# Patient Record
Sex: MALE | Race: WHITE | NOT HISPANIC OR LATINO | Employment: FULL TIME | URBAN - NONMETROPOLITAN AREA
[De-identification: names, ages, dates, MRNs, and addresses within clinical notes are randomized per-mention and may not be internally consistent; named-entity substitution may affect disease eponyms.]

---

## 2017-01-17 ENCOUNTER — HOSPITAL ENCOUNTER (OUTPATIENT)
Dept: GENERAL RADIOLOGY | Facility: HOSPITAL | Age: 32
Discharge: HOME | End: 2017-01-17
Attending: SURGERY

## 2017-02-23 ENCOUNTER — TRANSCRIBE ORDERS (OUTPATIENT)
Dept: ADMINISTRATIVE | Facility: HOSPITAL | Age: 32
End: 2017-02-23

## 2019-02-07 ENCOUNTER — OFFICE VISIT (OUTPATIENT)
Dept: PSYCHIATRY | Facility: CLINIC | Age: 34
End: 2019-02-07

## 2019-02-07 DIAGNOSIS — F11.20 OPIOID DEPENDENCE ON MAINTENANCE AGONIST THERAPY, NO SYMPTOMS (HCC): Primary | ICD-10-CM

## 2019-02-07 PROCEDURE — 90791 PSYCH DIAGNOSTIC EVALUATION: CPT | Performed by: SOCIAL WORKER
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
## 2020-04-02 ENCOUNTER — HOSPITAL ENCOUNTER (EMERGENCY)
Facility: HOSPITAL | Age: 35
Discharge: STILL ADMITTED OR ADMINISTRATIVE | End: 2020-04-02
Attending: FAMILY MEDICINE

## 2020-04-02 ENCOUNTER — HOSPITAL ENCOUNTER (INPATIENT)
Facility: HOSPITAL | Age: 35
LOS: 5 days | Discharge: HOME | End: 2020-04-07
Attending: PSYCHIATRY & NEUROLOGY | Admitting: PSYCHIATRY & NEUROLOGY

## 2020-04-02 VITALS
BODY MASS INDEX: 26.6 KG/M2 | RESPIRATION RATE: 18 BRPM | DIASTOLIC BLOOD PRESSURE: 88 MMHG | TEMPERATURE: 98.6 F | HEIGHT: 71 IN | OXYGEN SATURATION: 100 % | HEART RATE: 89 BPM | SYSTOLIC BLOOD PRESSURE: 174 MMHG | WEIGHT: 190 LBS

## 2020-04-02 DIAGNOSIS — F11.10 OPIATE ABUSE, CONTINUOUS (HCC): Primary | ICD-10-CM

## 2020-04-02 DIAGNOSIS — F11.93 OPIATE WITHDRAWAL (HCC): ICD-10-CM

## 2020-04-02 PROBLEM — F19.10 POLYSUBSTANCE ABUSE (HCC): Status: ACTIVE | Noted: 2020-04-02

## 2020-04-02 LAB
6-ACETYL MORPHINE: NEGATIVE
ALBUMIN SERPL-MCNC: 4.2 G/DL (ref 3.5–5.2)
ALBUMIN/GLOB SERPL: 1.4 G/DL
ALP SERPL-CCNC: 59 U/L (ref 39–117)
ALT SERPL W P-5'-P-CCNC: 8 U/L (ref 1–41)
AMPHET+METHAMPHET UR QL: NEGATIVE
ANION GAP SERPL CALCULATED.3IONS-SCNC: 9.5 MMOL/L (ref 5–15)
AST SERPL-CCNC: 15 U/L (ref 1–40)
BACTERIA UR QL AUTO: ABNORMAL /HPF
BARBITURATES UR QL SCN: NEGATIVE
BASOPHILS # BLD AUTO: 0.08 10*3/MM3 (ref 0–0.2)
BASOPHILS NFR BLD AUTO: 0.6 % (ref 0–1.5)
BENZODIAZ UR QL SCN: POSITIVE
BILIRUB SERPL-MCNC: 0.4 MG/DL (ref 0.2–1.2)
BILIRUB UR QL STRIP: NEGATIVE
BUN BLD-MCNC: 8 MG/DL (ref 6–20)
BUN/CREAT SERPL: 8.9 (ref 7–25)
BUPRENORPHINE SERPL-MCNC: NEGATIVE NG/ML
CALCIUM SPEC-SCNC: 9.3 MG/DL (ref 8.6–10.5)
CANNABINOIDS SERPL QL: NEGATIVE
CHLORIDE SERPL-SCNC: 104 MMOL/L (ref 98–107)
CLARITY UR: ABNORMAL
CO2 SERPL-SCNC: 28.5 MMOL/L (ref 22–29)
COCAINE UR QL: NEGATIVE
COLOR UR: YELLOW
CREAT BLD-MCNC: 0.9 MG/DL (ref 0.76–1.27)
DEPRECATED RDW RBC AUTO: 41.4 FL (ref 37–54)
EOSINOPHIL # BLD AUTO: 0.29 10*3/MM3 (ref 0–0.4)
EOSINOPHIL NFR BLD AUTO: 2 % (ref 0.3–6.2)
ERYTHROCYTE [DISTWIDTH] IN BLOOD BY AUTOMATED COUNT: 12.6 % (ref 12.3–15.4)
ETHANOL BLD-MCNC: <10 MG/DL (ref 0–10)
ETHANOL UR QL: <0.01 %
GFR SERPL CREATININE-BSD FRML MDRD: 98 ML/MIN/1.73
GLOBULIN UR ELPH-MCNC: 2.9 GM/DL
GLUCOSE BLD-MCNC: 99 MG/DL (ref 65–99)
GLUCOSE UR STRIP-MCNC: NEGATIVE MG/DL
HCT VFR BLD AUTO: 45.9 % (ref 37.5–51)
HGB BLD-MCNC: 15.7 G/DL (ref 13–17.7)
HGB UR QL STRIP.AUTO: NEGATIVE
HYALINE CASTS UR QL AUTO: ABNORMAL /LPF
IMM GRANULOCYTES # BLD AUTO: 0.04 10*3/MM3 (ref 0–0.05)
IMM GRANULOCYTES NFR BLD AUTO: 0.3 % (ref 0–0.5)
KETONES UR QL STRIP: NEGATIVE
LEUKOCYTE ESTERASE UR QL STRIP.AUTO: ABNORMAL
LYMPHOCYTES # BLD AUTO: 2.83 10*3/MM3 (ref 0.7–3.1)
LYMPHOCYTES NFR BLD AUTO: 19.9 % (ref 19.6–45.3)
MAGNESIUM SERPL-MCNC: 2.1 MG/DL (ref 1.6–2.6)
MCH RBC QN AUTO: 30.3 PG (ref 26.6–33)
MCHC RBC AUTO-ENTMCNC: 34.2 G/DL (ref 31.5–35.7)
MCV RBC AUTO: 88.6 FL (ref 79–97)
METHADONE UR QL SCN: NEGATIVE
MONOCYTES # BLD AUTO: 0.76 10*3/MM3 (ref 0.1–0.9)
MONOCYTES NFR BLD AUTO: 5.3 % (ref 5–12)
NEUTROPHILS # BLD AUTO: 10.23 10*3/MM3 (ref 1.7–7)
NEUTROPHILS NFR BLD AUTO: 71.9 % (ref 42.7–76)
NITRITE UR QL STRIP: NEGATIVE
NRBC BLD AUTO-RTO: 0 /100 WBC (ref 0–0.2)
OPIATES UR QL: POSITIVE
OXYCODONE UR QL SCN: NEGATIVE
PCP UR QL SCN: NEGATIVE
PH UR STRIP.AUTO: 8.5 [PH] (ref 5–8)
PLATELET # BLD AUTO: 356 10*3/MM3 (ref 140–450)
PMV BLD AUTO: 9.4 FL (ref 6–12)
POTASSIUM BLD-SCNC: 4.5 MMOL/L (ref 3.5–5.2)
PROT SERPL-MCNC: 7.1 G/DL (ref 6–8.5)
PROT UR QL STRIP: NEGATIVE
RBC # BLD AUTO: 5.18 10*6/MM3 (ref 4.14–5.8)
RBC # UR: ABNORMAL /HPF
REF LAB TEST METHOD: ABNORMAL
SODIUM BLD-SCNC: 142 MMOL/L (ref 136–145)
SP GR UR STRIP: 1.02 (ref 1–1.03)
SQUAMOUS #/AREA URNS HPF: ABNORMAL /HPF
UROBILINOGEN UR QL STRIP: ABNORMAL
WBC NRBC COR # BLD: 14.23 10*3/MM3 (ref 3.4–10.8)
WBC UR QL AUTO: ABNORMAL /HPF

## 2020-04-02 PROCEDURE — 80053 COMPREHEN METABOLIC PANEL: CPT | Performed by: FAMILY MEDICINE

## 2020-04-02 PROCEDURE — 80307 DRUG TEST PRSMV CHEM ANLYZR: CPT | Performed by: FAMILY MEDICINE

## 2020-04-02 PROCEDURE — 81001 URINALYSIS AUTO W/SCOPE: CPT | Performed by: FAMILY MEDICINE

## 2020-04-02 PROCEDURE — 99284 EMERGENCY DEPT VISIT MOD MDM: CPT

## 2020-04-02 PROCEDURE — 83735 ASSAY OF MAGNESIUM: CPT | Performed by: FAMILY MEDICINE

## 2020-04-02 PROCEDURE — 85025 COMPLETE CBC W/AUTO DIFF WBC: CPT | Performed by: FAMILY MEDICINE

## 2020-04-02 PROCEDURE — 93005 ELECTROCARDIOGRAM TRACING: CPT | Performed by: PSYCHIATRY & NEUROLOGY

## 2020-04-02 PROCEDURE — HZ2ZZZZ DETOXIFICATION SERVICES FOR SUBSTANCE ABUSE TREATMENT: ICD-10-PCS | Performed by: PSYCHIATRY & NEUROLOGY

## 2020-04-02 PROCEDURE — 93010 ELECTROCARDIOGRAM REPORT: CPT | Performed by: SPECIALIST

## 2020-04-02 RX ORDER — IBUPROFEN 400 MG/1
400 TABLET ORAL EVERY 6 HOURS PRN
Status: DISCONTINUED | OUTPATIENT
Start: 2020-04-02 | End: 2020-04-07 | Stop reason: HOSPADM

## 2020-04-02 RX ORDER — ONDANSETRON 4 MG/1
4 TABLET, FILM COATED ORAL EVERY 6 HOURS PRN
Status: DISCONTINUED | OUTPATIENT
Start: 2020-04-02 | End: 2020-04-07 | Stop reason: HOSPADM

## 2020-04-02 RX ORDER — CLONIDINE HYDROCHLORIDE 0.1 MG/1
0.1 TABLET ORAL 2 TIMES DAILY PRN
Status: ACTIVE | OUTPATIENT
Start: 2020-04-05 | End: 2020-04-06

## 2020-04-02 RX ORDER — CLONIDINE HYDROCHLORIDE 0.1 MG/1
0.1 TABLET ORAL 3 TIMES DAILY PRN
Status: DISPENSED | OUTPATIENT
Start: 2020-04-04 | End: 2020-04-05

## 2020-04-02 RX ORDER — BENZTROPINE MESYLATE 1 MG/1
2 TABLET ORAL ONCE AS NEEDED
Status: DISCONTINUED | OUTPATIENT
Start: 2020-04-02 | End: 2020-04-07 | Stop reason: HOSPADM

## 2020-04-02 RX ORDER — FAMOTIDINE 20 MG/1
20 TABLET, FILM COATED ORAL 2 TIMES DAILY PRN
Status: DISCONTINUED | OUTPATIENT
Start: 2020-04-02 | End: 2020-04-07 | Stop reason: HOSPADM

## 2020-04-02 RX ORDER — LORAZEPAM 0.5 MG/1
0.5 TABLET ORAL EVERY 4 HOURS PRN
Status: ACTIVE | OUTPATIENT
Start: 2020-04-06 | End: 2020-04-07

## 2020-04-02 RX ORDER — CLONIDINE HYDROCHLORIDE 0.1 MG/1
0.1 TABLET ORAL 4 TIMES DAILY PRN
Status: ACTIVE | OUTPATIENT
Start: 2020-04-02 | End: 2020-04-03

## 2020-04-02 RX ORDER — LORAZEPAM 2 MG/1
2 TABLET ORAL EVERY 4 HOURS PRN
Status: ACTIVE | OUTPATIENT
Start: 2020-04-03 | End: 2020-04-04

## 2020-04-02 RX ORDER — LORAZEPAM 1 MG/1
1 TABLET ORAL
Status: COMPLETED | OUTPATIENT
Start: 2020-04-05 | End: 2020-04-05

## 2020-04-02 RX ORDER — HYDROXYZINE 50 MG/1
50 TABLET, FILM COATED ORAL EVERY 6 HOURS PRN
Status: DISCONTINUED | OUTPATIENT
Start: 2020-04-02 | End: 2020-04-07 | Stop reason: HOSPADM

## 2020-04-02 RX ORDER — LORAZEPAM 0.5 MG/1
0.5 TABLET ORAL
Status: DISPENSED | OUTPATIENT
Start: 2020-04-06 | End: 2020-04-07

## 2020-04-02 RX ORDER — BENZONATATE 100 MG/1
100 CAPSULE ORAL 3 TIMES DAILY PRN
Status: DISCONTINUED | OUTPATIENT
Start: 2020-04-02 | End: 2020-04-07 | Stop reason: HOSPADM

## 2020-04-02 RX ORDER — BENZTROPINE MESYLATE 1 MG/ML
1 INJECTION INTRAMUSCULAR; INTRAVENOUS ONCE AS NEEDED
Status: DISCONTINUED | OUTPATIENT
Start: 2020-04-02 | End: 2020-04-07 | Stop reason: HOSPADM

## 2020-04-02 RX ORDER — DICYCLOMINE HYDROCHLORIDE 10 MG/1
10 CAPSULE ORAL 3 TIMES DAILY PRN
Status: DISCONTINUED | OUTPATIENT
Start: 2020-04-02 | End: 2020-04-07 | Stop reason: HOSPADM

## 2020-04-02 RX ORDER — ECHINACEA PURPUREA EXTRACT 125 MG
2 TABLET ORAL AS NEEDED
Status: DISCONTINUED | OUTPATIENT
Start: 2020-04-02 | End: 2020-04-07 | Stop reason: HOSPADM

## 2020-04-02 RX ORDER — LORAZEPAM 2 MG/1
2 TABLET ORAL
Status: DISPENSED | OUTPATIENT
Start: 2020-04-02 | End: 2020-04-03

## 2020-04-02 RX ORDER — LOPERAMIDE HYDROCHLORIDE 2 MG/1
2 CAPSULE ORAL
Status: DISCONTINUED | OUTPATIENT
Start: 2020-04-02 | End: 2020-04-07 | Stop reason: HOSPADM

## 2020-04-02 RX ORDER — TRAZODONE HYDROCHLORIDE 50 MG/1
50 TABLET ORAL NIGHTLY PRN
Status: DISCONTINUED | OUTPATIENT
Start: 2020-04-02 | End: 2020-04-07 | Stop reason: HOSPADM

## 2020-04-02 RX ORDER — ACETAMINOPHEN 325 MG/1
650 TABLET ORAL EVERY 6 HOURS PRN
Status: DISCONTINUED | OUTPATIENT
Start: 2020-04-02 | End: 2020-04-07 | Stop reason: HOSPADM

## 2020-04-02 RX ORDER — CYCLOBENZAPRINE HCL 10 MG
10 TABLET ORAL 3 TIMES DAILY PRN
Status: DISCONTINUED | OUTPATIENT
Start: 2020-04-02 | End: 2020-04-07 | Stop reason: HOSPADM

## 2020-04-02 RX ORDER — CLONIDINE HYDROCHLORIDE 0.1 MG/1
0.1 TABLET ORAL 4 TIMES DAILY PRN
Status: DISPENSED | OUTPATIENT
Start: 2020-04-03 | End: 2020-04-04

## 2020-04-02 RX ORDER — LORAZEPAM 1 MG/1
1 TABLET ORAL EVERY 4 HOURS PRN
Status: ACTIVE | OUTPATIENT
Start: 2020-04-05 | End: 2020-04-06

## 2020-04-02 RX ORDER — CLONIDINE HYDROCHLORIDE 0.1 MG/1
0.1 TABLET ORAL DAILY PRN
Status: ACTIVE | OUTPATIENT
Start: 2020-04-06 | End: 2020-04-07

## 2020-04-02 RX ORDER — ALUMINA, MAGNESIA, AND SIMETHICONE 2400; 2400; 240 MG/30ML; MG/30ML; MG/30ML
15 SUSPENSION ORAL EVERY 6 HOURS PRN
Status: DISCONTINUED | OUTPATIENT
Start: 2020-04-02 | End: 2020-04-07 | Stop reason: HOSPADM

## 2020-04-02 RX ORDER — NICOTINE 21 MG/24HR
1 PATCH, TRANSDERMAL 24 HOURS TRANSDERMAL
Status: DISCONTINUED | OUTPATIENT
Start: 2020-04-02 | End: 2020-04-07

## 2020-04-02 RX ORDER — LORAZEPAM 2 MG/1
2 TABLET ORAL
Status: COMPLETED | OUTPATIENT
Start: 2020-04-03 | End: 2020-04-03

## 2020-04-02 RX ADMIN — LOPERAMIDE HYDROCHLORIDE 2 MG: 2 CAPSULE ORAL at 17:55

## 2020-04-02 RX ADMIN — LORAZEPAM 2 MG: 2 TABLET ORAL at 17:55

## 2020-04-02 NOTE — ED PROVIDER NOTES
Subjective   Patient is a 34-year-old male presents emergency department to detox from opiates.  The patient states that he plans on attending a rehab program who informed him that he needed to present to the ER to detox from opiates before he can enter into this program.  The patient states that this is not state-mandated.  He states that he has been using Percocet 10 mg 3 times a day and that he prefers to snort or inject them.  The patient states that he has not used any additional drugs.  He states that the last time he used was last night.  He denies any fever, chills or any additional symptoms.  He states right now his only symptom is that he feels very tired.  He has no other complaints at this time.          Review of Systems   Constitutional: Positive for fatigue. Negative for activity change, appetite change, chills, diaphoresis and fever.   HENT: Negative for congestion, sinus pressure, sinus pain, sneezing and sore throat.    Respiratory: Negative for apnea, cough, shortness of breath and wheezing.    Gastrointestinal: Negative for abdominal distention, abdominal pain, diarrhea, nausea and vomiting.   Genitourinary: Negative for difficulty urinating and flank pain.   Musculoskeletal: Negative for arthralgias and myalgias.   Neurological: Negative for dizziness, facial asymmetry, light-headedness and headaches.   Psychiatric/Behavioral: Negative for agitation and confusion.       Past Medical History:   Diagnosis Date   • Arrhythmia    • Chest pain    • Fatigue    • Gastroenteritis    • Hypertension    • Substance abuse (CMS/[facility])        No Known Allergies    Past Surgical History:   Procedure Laterality Date   • URETEROSCOPY LASER LITHOTRIPSY WITH STENT INSERTION         History reviewed. No pertinent family history.    Social History     Socioeconomic History   • Marital status:      Spouse name: Not on file   • Number of children: Not on file   • Years of education: Not on file   • Highest  education level: Not on file   Tobacco Use   • Smoking status: Current Every Day Smoker     Packs/day: 1.00     Types: Cigarettes   • Smokeless tobacco: Never Used   Substance and Sexual Activity   • Alcohol use: Not Currently   • Drug use: Yes     Types: Benzodiazepines, Heroin, Oxycodone   • Sexual activity: Not Currently     Partners: Female           Objective   Physical Exam   Constitutional: He is oriented to person, place, and time. He appears well-developed and well-nourished. No distress.   HENT:   Head: Normocephalic and atraumatic.   Right Ear: External ear normal.   Left Ear: External ear normal.   Nose: Nose normal.   Mouth/Throat: Oropharynx is clear and moist. No oropharyngeal exudate.   Eyes: Pupils are equal, round, and reactive to light. Conjunctivae and EOM are normal. Right eye exhibits no discharge. Left eye exhibits no discharge. No scleral icterus.   Neck: Normal range of motion. Neck supple. No JVD present. No tracheal deviation present. No thyromegaly present.   Cardiovascular: Normal rate, regular rhythm, normal heart sounds and intact distal pulses. Exam reveals no gallop and no friction rub.   No murmur heard.  Pulmonary/Chest: Effort normal and breath sounds normal. No stridor. No respiratory distress. He has no wheezes. He has no rales.   Abdominal: Soft. Bowel sounds are normal. He exhibits no distension and no mass. There is no tenderness.   Musculoskeletal: Normal range of motion. He exhibits no edema, tenderness or deformity.   Lymphadenopathy:     He has no cervical adenopathy.   Neurological: He is alert and oriented to person, place, and time. No cranial nerve deficit. Coordination normal.   Skin: Skin is warm and dry. Capillary refill takes less than 2 seconds. No rash noted. He is not diaphoretic. No erythema.   Psychiatric: He has a normal mood and affect. His behavior is normal.   Nursing note and vitals reviewed.      Procedures           ED Course                                            MDM  Number of Diagnoses or Management Options  Opiate abuse, continuous (CMS/HCC): new and requires workup  Opiate withdrawal (CMS/HCC): new and requires workup     Amount and/or Complexity of Data Reviewed  Clinical lab tests: ordered and reviewed    Risk of Complications, Morbidity, and/or Mortality  Presenting problems: moderate  Diagnostic procedures: moderate  Management options: moderate    Patient Progress  Patient progress: stable      Final diagnoses:   Opiate abuse, continuous (CMS/HCC)   Opiate withdrawal (CMS/HCC)            Danna Samaniego DO  04/02/20 8612

## 2020-04-02 NOTE — PLAN OF CARE
Problem: Patient Care Overview  Goal: Plan of Care Review  Outcome: Ongoing (interventions implemented as appropriate)  Flowsheets (Taken 4/2/2020 4861)  Progress: no change  Plan of Care Reviewed With: patient  Patient Agreement with Plan of Care: agrees

## 2020-04-02 NOTE — NURSING NOTE
Pt states he is here for detox from Xanax, states he takes 4 xanax several times throughout the week, maybe 3 days a week, with the last dose being 3 days ago, pt states he also takes percocet and Heroin, states his last percocet was 4 days ago, states he takes 30 mg at one time, several times weekly, states he also used Heroin for first time two days ago, using 1/10 of a gram.  Pt states his current craving level is 6/10 at this time.    Pt denies any SI/HI/AVH.    Pt states he has been clean for 6 years and just relapsed two months ago when he and his wife , and he does not want to get addicted like he once was, so he is here to seek detox and move forward.    COWS is a 12, CIWA is a 10, depression 6/10 and anxiety 0/10.

## 2020-04-02 NOTE — NURSING NOTE
Pt educated on importance of handwashing, social distancing and not touching his face. Pt verbalized understanding.

## 2020-04-02 NOTE — NURSING NOTE
Spoke with Dr. Nuno, discussed assessment and labs, new orders to admit the patient to detox with routine orders, SP4, Ativan and Clonidine detox. [id number]

## 2020-04-02 NOTE — NURSING NOTE
Patient is seeking help to detox from Xanax and opiates and go to rehab. He reports relapse after being clean for 6 years. He states that his 13 year marriage is ending. He reports finding out about 3 months ago his wife was using drugs again. She went missing for a period of time and she is not interested in getting clean. Patient states he has three children who are now living with his parents. He reports using 3-5 mg of Xanax, po or snort daily x 2-3 months. Taking Percocent 30 mg, snorted daily x 2-3 months and he reports using IV Heroin twice in the last several days. He reports no other hx of IV drug use. Anxiety rated 3/10, depression rated 7/10 and craving rated 5/10. Appetite is good, he reports fatigue and feeling like he wants to sleep all the time.

## 2020-04-03 PROBLEM — F13.20 SEVERE BENZODIAZEPINE USE DISORDER (HCC): Status: ACTIVE | Noted: 2020-04-03

## 2020-04-03 PROBLEM — F11.20 OPIOID USE DISORDER, SEVERE, DEPENDENCE (HCC): Status: ACTIVE | Noted: 2020-04-02

## 2020-04-03 LAB
HAV IGM SERPL QL IA: NORMAL
HBV CORE IGM SERPL QL IA: NORMAL
HBV SURFACE AG SERPL QL IA: NORMAL
HCV AB SER DONR QL: NORMAL
HIV1+2 AB SER QL: NORMAL
HOLD SPECIMEN: NORMAL

## 2020-04-03 PROCEDURE — 25010000002 INFLUENZA VAC SUBUNIT QUAD 0.5 ML SUSPENSION PREFILLED SYRINGE: Performed by: PSYCHIATRY & NEUROLOGY

## 2020-04-03 PROCEDURE — G0008 ADMIN INFLUENZA VIRUS VAC: HCPCS | Performed by: PSYCHIATRY & NEUROLOGY

## 2020-04-03 PROCEDURE — 80074 ACUTE HEPATITIS PANEL: CPT | Performed by: PSYCHIATRY & NEUROLOGY

## 2020-04-03 PROCEDURE — G0432 EIA HIV-1/HIV-2 SCREEN: HCPCS | Performed by: PSYCHIATRY & NEUROLOGY

## 2020-04-03 PROCEDURE — 90674 CCIIV4 VAC NO PRSV 0.5 ML IM: CPT | Performed by: PSYCHIATRY & NEUROLOGY

## 2020-04-03 PROCEDURE — 99223 1ST HOSP IP/OBS HIGH 75: CPT | Performed by: PSYCHIATRY & NEUROLOGY

## 2020-04-03 RX ADMIN — CYCLOBENZAPRINE HYDROCHLORIDE 10 MG: 10 TABLET, FILM COATED ORAL at 21:32

## 2020-04-03 RX ADMIN — CYCLOBENZAPRINE HYDROCHLORIDE 10 MG: 10 TABLET, FILM COATED ORAL at 08:27

## 2020-04-03 RX ADMIN — TRAZODONE HYDROCHLORIDE 50 MG: 50 TABLET ORAL at 21:32

## 2020-04-03 RX ADMIN — IBUPROFEN 400 MG: 400 TABLET, FILM COATED ORAL at 08:27

## 2020-04-03 RX ADMIN — LORAZEPAM 2 MG: 2 TABLET ORAL at 16:18

## 2020-04-03 RX ADMIN — INFLUENZA A VIRUS A/IDAHO/07/2018 (H1N1) ANTIGEN (MDCK CELL DERIVED, PROPIOLACTONE INACTIVATED, INFLUENZA A VIRUS A/INDIANA/08/2018 (H3N2) ANTIGEN (MDCK CELL DERIVED, PROPIOLACTONE INACTIVATED), INFLUENZA B VIRUS B/SINGAPORE/INFTT-16-0610/2016 ANTIGEN (MDCK CELL DERIVED, PROPIOLACTONE INACTIVATED), INFLUENZA B VIRUS B/IOWA/06/2017 ANTIGEN (MDCK CELL DERIVED, PROPIOLACTONE INACTIVATED) 0.5 ML: 15; 15; 15; 15 INJECTION, SUSPENSION INTRAMUSCULAR at 08:28

## 2020-04-03 RX ADMIN — LOPERAMIDE HYDROCHLORIDE 2 MG: 2 CAPSULE ORAL at 08:27

## 2020-04-03 RX ADMIN — LORAZEPAM 2 MG: 2 TABLET ORAL at 21:33

## 2020-04-03 RX ADMIN — HYDROXYZINE HYDROCHLORIDE 50 MG: 50 TABLET ORAL at 20:46

## 2020-04-03 RX ADMIN — CLONIDINE HYDROCHLORIDE 0.1 MG: 0.1 TABLET ORAL at 20:46

## 2020-04-03 RX ADMIN — LORAZEPAM 2 MG: 2 TABLET ORAL at 08:27

## 2020-04-03 NOTE — PROGRESS NOTES
Review of Systems   Constitutional: Positive for fatigue.   HENT: Negative.    Eyes: Negative.    Respiratory: Negative.    Cardiovascular: Negative.    Gastrointestinal: Positive for diarrhea.   Endocrine: Negative.    Genitourinary: Negative.    Musculoskeletal: Positive for myalgias.   Skin: Negative.    Allergic/Immunologic: Negative.    Neurological: Negative.    Hematological: Negative.    Psychiatric/Behavioral: Positive for dysphoric mood. The patient is nervous/anxious.        Physical Exam   Constitutional: oriented to person, place, and time. Appears well-developed and well-nourished.   HENT:   Head: Normocephalic and atraumatic.   Right Ear: External ear normal.   Left Ear: External ear normal.   Mouth/Throat: Oropharynx is clear and moist.   Eyes: Pupils are equal, round, and reactive to light. Conjunctivae and EOM are normal.   Neck: Normal range of motion. Neck supple.   Cardiovascular: Normal rate, regular rhythm and normal heart sounds.    Pulmonary/Chest: Effort normal and breath sounds normal. No respiratory distress. No wheezes.   Abdominal: Soft. Bowel sounds are normal.No distension. There is no tenderness.   Musculoskeletal: Normal range of motion. No edema or deformity.   Neurological:Alert and oriented to person, place, and time. No cranial nerve deficit. Coordination normal.   Skin: Track marks noted right AC area.    DATA  Labs reviewed  EKG reviewed, QTc 438.  ESTHELA reviewed  Record reviewed. The patient was seen in the outpatient behavioral health clinic in Feb 2019 for an evaluation for his work. At that time he was on MAT.    DX    Opioid use disorder, severe, dependence (CMS/HCC)  - Clonidine detox      Severe benzodiazepine use disorder (CMS/HCC)  - Ativan detox

## 2020-04-03 NOTE — PROGRESS NOTES
1200:     DATA:      This provider is located at [facility], and the Patient  is seen remotely at [facility] using [facility]. The patient's condition being treated is appropriate for telehealth services. The provider identified herself as well as her credentials.   The patient has given consent to be seen remotely, and when consent is given they understand that the consent allows for patient identifiable information to be sent to a third party as needed.   They may refuse to be seen remotely at any time. The electronic data is encrypted and password protected, and the patient has been advised of the potential risks to privacy not withstanding such measures.     Therapist met individually with patient this date to introduce role and to discuss hospitalization expectations. Patient agreeable.     Patient signed consent for his mother Sanjuanita at [phone]; Sanjuanita was contacted this date and supportive. She reports that she hopes that patient will stick with plan and enter [facility]. She was agreeable to [facility] transporting at discharge.      Clinical Maneuvering/Intervention:     Therapist assisted patient in processing above session content; acknowledged and normalized patient’s thoughts, feelings, and concerns.  Discussed the therapist/patient relationship and explain the parameters and limitations of relative confidentiality.  Also discussed the importance of active participation, and honesty to the treatment process.  Encouraged the patient to discuss/vent their feelings, frustrations, and fears concerning their ongoing medical issues and validated their feelings.     Discussed the importance of finding enjoyable activities and coping skills that the patient can engage in a regular basis. Discussed healthy coping skills such as distraction, self love, grounding, thought challenges/reframing, etc.  Provided patient with list of healthy coping skills this date.  Discussed the importance of medication compliance.  Praised the patient for seeking help and spent the majority of the session building rapport.       Allowed patient to freely discuss issues without interruption or judgment. Provided safe, confidential environment to facilitate the development of positive therapeutic relationship and encourage open, honest communication.    Therapist educated patient about the importance of social distancing and regular hand washing during COVID19. Provided emotional support and reassurance.       Therapist addressed discharge safety planning this date. Assisted patient in identifying risk factors which would indicate the need for higher level of care after discharge;  including thoughts to harm self or others and/or self-harming behavior. Encouraged patient to call 911, or present to the nearest emergency room should any of these events occur. Discussed crisis intervention services and means to access.  Encouraged securing any objects of harm.      Therapist educated patient about the importance of social distancing and regular hand washing during COVID19.      Therapist completed integrated summary, treatment plan, and initiated social history this date.  Therapist is strongly encouraging family involvement in treatment.       ASSESSMENT:      The patient is a 34 year old , , unemployed male.  Patient calm and cooperative and seen in the office with telehealth services.  Per report, Patient is seeking help to detox from Xanax and Opiates and go to rehab. He reports relapse after being clean for 6 years. He states that his 13 year marriage is ending, he had thought his wife had nickolas acting bizarrely, and found out she had started using drugs. Patient states he has three children who are now living with his parents. He reports using 3-5 mg of Xanax, po or snort daily x 2-3 months. Taking Percocent 30 mg, snorted daily x 2-3 months and he reports using IV Heroin  twice in the last several days. Patent reports that he is motivated to get sober for his kids and to get his job back at the [employer].  Patient reports that he had went to [facility] and that they recommended patient seek detox first.  He has signed consent to return to [facility] at discharge.      PLAN:       Patient to remain hospitalized this date.     Treatment team will focus efforts on stabilizing patient's acute symptoms while providing education on healthy coping and crisis management to reduce hospitalizations.   Patient requires daily psychiatrist evaluation and 24/7 nursing supervision to promote patient  safety.     Therapist will offer 1-4 individual sessions, 1 therapy group daily, family education, and appropriate referral.    Therapist recommends residential referral at discharge. Patient has signed consent for [facility]. [facility] contacted this date and confirmed bed at discharge. [facility] to transport.

## 2020-04-03 NOTE — PLAN OF CARE
Problem: Patient Care Overview  Goal: Plan of Care Review  Outcome: Ongoing (interventions implemented as appropriate)  Flowsheets (Taken 4/3/2020 8010)  Progress: improving  Plan of Care Reviewed With: patient  Patient Agreement with Plan of Care: agrees       Pt reports leg cramps, stomach cramps, sweating, anxiety 5/10, depression 7/10, and cravings 7/10.  Elevated blood pressure and tremors +1. Pt participated in group, appetite is good, and slept well.

## 2020-04-03 NOTE — PLAN OF CARE
Problem: Patient Care Overview  Goal: Plan of Care Review  Outcome: Ongoing (interventions implemented as appropriate)  Flowsheets  Taken 4/3/2020 1242 by Antonieta Molina  Consent Given to Review Plan with: MotherSanjuanita  Progress: no change  Plan of Care Reviewed With: patient;mother  Outcome Summary: Therapist met individually with patient this date. Completed social history and integrated summary  Taken 4/3/2020 0710 by Ashley Enciso RN  Patient Agreement with Plan of Care: agrees  Goal: Individualization and Mutuality  Outcome: Ongoing (interventions implemented as appropriate)  Flowsheets  Taken 4/3/2020 1159  Patient Personal Strengths: expressive of needs;expressive of emotions;family/social support;motivated for recovery;motivated for treatment;resourceful;stable living environment;spiritual/Jainism support  Patient Vulnerabilities: Ineffective coping   Taken 4/3/2020 1242  Patient Specific Goals (Include Timeframe): Patient will deny acute withdrawal symptoms prior to discharge. Patient will identify 1 healthy coping skill as alternative to substance use prior to discharge.  Patient will consent to appropriat aftercare plan prior to discharge.  How to Address Anxieties/Fears: Patient agreeable to talk to staff as needed  Patient Specific Interventions: Therapist will offer 1-4 individual sessions, family education, aftercare planning  What Information Would Help Us Give You More Personalized Care?: None reported  How Would You and/or Your Support Person Like to Participate in Your Care?: Patient signed consent to involve his mother Sanjuanita via telephone  What Anxieties, Fears, Concerns, or Questions Do You Have About Your Care?: None reported.  Patient Specific Preferences: None reported  Goal: Discharge Needs Assessment  Outcome: Ongoing (interventions implemented as appropriate)  Flowsheets  Taken 4/3/2020 1242 by Antonieta Molina  Outpatient/Agency/Support Group Needs:  residential services  Discharge Coordination/Progress: Patient has insurance for discharge planning.  Patient signed consent for [facility].  Concerns Comments: Patient has insurance for dischrage planning.  Anticipated Discharge Disposition: inpatient rehab facility  Transportation Concerns: car, none  Current Discharge Risk: substance use/abuse;financial support inadequate  Concerns to be Addressed: coping/stress;mental health;substance/tobacco abuse/use;relationship  Readmission Within the Last 30 Days: no previous admission in last 30 days  Patient's Choice of Community Agency(s): [facility]  Offered/Gave Vendor List: no  Taken 4/2/2020 1808 by Michaelle Armendariz, RN  Transportation Anticipated: family or friend will provide  Patient/Family Anticipated Services at Transition: rehabilitation services  Patient/Family Anticipates Transition to: inpatient rehabilitation facility  Goal: Interprofessional Rounds/Family Conf  Outcome: Ongoing (interventions implemented as appropriate)  Flowsheets (Taken 4/3/2020 1242)  Participants: patient; social work; family; psychiatrist; nursing; milieu/psych techs     Problem: Overarching Goals (Adult)  Goal: Adheres to Safety Considerations for Self and Others  Outcome: Ongoing (interventions implemented as appropriate)  Goal: Optimized Coping Skills in Response to Life Stressors  Intervention: Promote Effective Coping Strategies  Flowsheets (Taken 4/3/2020 0710 by Ashley Enciso, RN)  Supportive Measures: active listening utilized;self-care encouraged;verbalization of feelings encouraged  Goal: Develops/Participates in Therapeutic Declo to Support Successful Transition  Intervention: Foster Therapeutic Declo  Flowsheets (Taken 4/3/2020 0710 by Ashley Enciso, RN)  Trust Relationship/Rapport: care explained;choices provided;emotional support provided;empathic listening provided;reassurance provided;thoughts/feelings acknowledged;questions encouraged;questions  answered  Intervention: Mutually Develop Transition Plan  Flowsheets (Taken 4/3/2020 4448)  Transition Support: community resources reviewed; follow-up care coordinated; follow-up care discussed; crisis management plan promoted; crisis management plan verbalized

## 2020-04-03 NOTE — PLAN OF CARE
Problem: Patient Care Overview  Goal: Plan of Care Review  Outcome: Ongoing (interventions implemented as appropriate)  Flowsheets (Taken 4/3/2020 1432)  Progress: no change  Plan of Care Reviewed With: patient  Patient Agreement with Plan of Care: agrees   Patient has isolated in his room most of the day, C/o fatigued, body aches, and diarrhea.
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
## 2020-04-04 PROCEDURE — 99232 SBSQ HOSP IP/OBS MODERATE 35: CPT | Performed by: PSYCHIATRY & NEUROLOGY

## 2020-04-04 RX ADMIN — LORAZEPAM 1.5 MG: 1 TABLET ORAL at 15:03

## 2020-04-04 RX ADMIN — CYCLOBENZAPRINE HYDROCHLORIDE 10 MG: 10 TABLET, FILM COATED ORAL at 21:01

## 2020-04-04 RX ADMIN — IBUPROFEN 400 MG: 400 TABLET, FILM COATED ORAL at 08:22

## 2020-04-04 RX ADMIN — ACETAMINOPHEN 650 MG: 325 TABLET ORAL at 12:40

## 2020-04-04 RX ADMIN — LOPERAMIDE HYDROCHLORIDE 2 MG: 2 CAPSULE ORAL at 12:40

## 2020-04-04 RX ADMIN — CLONIDINE HYDROCHLORIDE 0.1 MG: 0.1 TABLET ORAL at 20:06

## 2020-04-04 RX ADMIN — TRAZODONE HYDROCHLORIDE 50 MG: 50 TABLET ORAL at 21:01

## 2020-04-04 RX ADMIN — HYDROXYZINE HYDROCHLORIDE 50 MG: 50 TABLET ORAL at 21:01

## 2020-04-04 RX ADMIN — LOPERAMIDE HYDROCHLORIDE 2 MG: 2 CAPSULE ORAL at 20:36

## 2020-04-04 RX ADMIN — CYCLOBENZAPRINE HYDROCHLORIDE 10 MG: 10 TABLET, FILM COATED ORAL at 12:40

## 2020-04-04 RX ADMIN — LORAZEPAM 1.5 MG: 1 TABLET ORAL at 08:22

## 2020-04-04 RX ADMIN — LORAZEPAM 1.5 MG: 1 TABLET ORAL at 21:01

## 2020-04-04 NOTE — PLAN OF CARE
Problem: Patient Care Overview  Goal: Plan of Care Review  Outcome: Ongoing (interventions implemented as appropriate)  Flowsheets  Taken 4/3/2020 1242 by Antonieta Molina  Consent Given to Review Plan with: Sanjuanita Segura  Outcome Summary: Therapist met individually with patient this date. Completed social history and integrated summary  Taken 4/4/2020 0615 by Jazmín Feliz, RN  Progress: improving  Taken 4/4/2020 0742 by Darrian Velasco, RN  Plan of Care Reviewed With: patient  Patient Agreement with Plan of Care: agrees

## 2020-04-04 NOTE — PROGRESS NOTES
"Chevy Ugalde is a 34 y.o. male who presents today for hospital follow up    Chief Complaint: Polysubstance abuse    History of Present Illness: Patient is a 15-year-old  male currently admitted with complaints of polysubstance abuse requesting detox from benzodiazepines and opioids.  He is evaluated at bedside today.  He denies any major withdrawal symptoms.  He states, \"I am doing good.\"  He denies any issues with sleep or appetite.  He denies any medication side effects.  He denies HI/SI/AVH.    The following portions of the patient's history were reviewed and updated as appropriate: allergies, current medications, past family history, past medical history, past social history, past surgical history and problem list.      Past Medical History:  Past Medical History:   Diagnosis Date   • Arrhythmia    • Chest pain    • Fatigue    • Gastroenteritis    • Hypertension    • Substance abuse (CMS/HCC)        Social History:  Social History     Socioeconomic History   • Marital status:      Spouse name: Not on file   • Number of children: Not on file   • Years of education: Not on file   • Highest education level: Not on file   Tobacco Use   • Smoking status: Current Every Day Smoker     Packs/day: 1.00     Years: 17.00     Pack years: 17.00     Types: Cigarettes   • Smokeless tobacco: Never Used   • Tobacco comment: smoking since 17 years old   Substance and Sexual Activity   • Alcohol use: Not Currently   • Drug use: Yes     Types: Benzodiazepines, Heroin, Oxycodone   • Sexual activity: Not Currently     Partners: Female       Family History:  No family history on file.    Past Surgical History:  Past Surgical History:   Procedure Laterality Date   • URETEROSCOPY LASER LITHOTRIPSY WITH STENT INSERTION         Problem List:  Patient Active Problem List   Diagnosis   • Opioid use disorder, severe, dependence (CMS/HCC)   • Severe benzodiazepine use disorder (CMS/HCC)       Allergy:   No Known " Allergies     Current Medications:   Current Facility-Administered Medications   Medication Dose Route Frequency Provider Last Rate Last Dose   • acetaminophen (TYLENOL) tablet 650 mg  650 mg Oral Q6H PRN Nick Nuno MD       • aluminum-magnesium hydroxide-simethicone (MAALOX MAX) 400-400-40 MG/5ML suspension 15 mL  15 mL Oral Q6H PRN Nick Nuno MD       • benzonatate (TESSALON) capsule 100 mg  100 mg Oral TID PRN Nick Nuno MD       • benztropine (COGENTIN) tablet 2 mg  2 mg Oral Once PRN Nikc Nuno MD        Or   • benztropine (COGENTIN) injection 1 mg  1 mg Intramuscular Once PRN Nick Nuno MD       • cloNIDine (CATAPRES) tablet 0.1 mg  0.1 mg Oral TID PRN Nick Nuno MD        Followed by   • [START ON 4/5/2020] cloNIDine (CATAPRES) tablet 0.1 mg  0.1 mg Oral BID PRN Nick Nuno MD        Followed by   • [START ON 4/6/2020] cloNIDine (CATAPRES) tablet 0.1 mg  0.1 mg Oral Daily PRN Nick Nuno MD       • cyclobenzaprine (FLEXERIL) tablet 10 mg  10 mg Oral TID PRN Nick Nuno MD   10 mg at 04/03/20 2132   • dicyclomine (BENTYL) capsule 10 mg  10 mg Oral TID PRN Nick Nuno MD       • famotidine (PEPCID) tablet 20 mg  20 mg Oral BID PRN Nick Nuno MD       • hydrOXYzine (ATARAX) tablet 50 mg  50 mg Oral Q6H PRN Nick Nuno MD   50 mg at 04/03/20 2046   • ibuprofen (ADVIL,MOTRIN) tablet 400 mg  400 mg Oral Q6H PRN Nick Nuno MD   400 mg at 04/04/20 0822   • loperamide (IMODIUM) capsule 2 mg  2 mg Oral Q2H PRN Nick Nuno MD   2 mg at 04/03/20 0827   • LORazepam (ATIVAN) tablet 1.5 mg  1.5 mg Oral 3 times per day Nick Nuno MD   1.5 mg at 04/04/20 0822    Followed by   • [START ON 4/5/2020] LORazepam (ATIVAN) tablet 1 mg  1 mg Oral 3 times per day Nick Nuno MD        Followed by   • [START ON 4/6/2020] LORazepam (ATIVAN) tablet 0.5 mg  0.5 mg Oral 3 times per day Nick Nuno MD       • LORazepam (ATIVAN) tablet 1.5 mg  1.5  "mg Oral Q4H PRN Nick Nuno MD        Followed by   • [START ON 4/5/2020] LORazepam (ATIVAN) tablet 1 mg  1 mg Oral Q4H PRN Nick Nuno MD        Followed by   • [START ON 4/6/2020] LORazepam (ATIVAN) tablet 0.5 mg  0.5 mg Oral Q4H PRN Nick Nuno MD       • magnesium hydroxide (MILK OF MAGNESIA) suspension 2400 mg/10mL 10 mL  10 mL Oral Daily PRN Nick Nuno MD       • nicotine (NICODERM CQ) 21 MG/24HR patch 1 patch  1 patch Transdermal Q24H Nick Nuno MD       • ondansetron (ZOFRAN) tablet 4 mg  4 mg Oral Q6H PRN Nick Nuno MD       • sodium chloride nasal spray 2 spray  2 spray Each Nare PRN Nick Nuno MD       • traZODone (DESYREL) tablet 50 mg  50 mg Oral Nightly PRN Nick Nuno MD   50 mg at 04/03/20 2132       Review of Symptoms:    Review of Systems   Constitutional: Negative.    HENT: Negative.    Eyes: Negative.    Respiratory: Negative.    Cardiovascular: Negative.    Gastrointestinal: Negative.    Endocrine: Negative.    Genitourinary: Negative.    Musculoskeletal: Negative.    Skin: Negative.    Allergic/Immunologic: Negative.    Neurological: Negative.    Hematological: Negative.    Psychiatric/Behavioral: Negative.          Physical Exam:   Blood pressure 138/66, pulse 80, temperature 97.4 °F (36.3 °C), temperature source Temporal, resp. rate 18, height 180.3 cm (71\"), weight 84.8 kg (187 lb), SpO2 99 %.    Appearance: Patient male of stated age in no acute distress  Gait, Station, Strength: Within normal limits    Mental Status Exam:   Hygiene:   good  Cooperation:  Cooperative  Eye Contact:  Good  Psychomotor Behavior:  Appropriate  Affect:  Full range  Mood: normal  Hopelessness: Denies  Speech:  Normal  Thought Process:  Goal directed and Linear  Thought Content:  Normal  Suicidal:  None  Homicidal:  None  Hallucinations:  None  Delusion:  None  Memory:  Intact  Orientation:  Person, Place, Time and Situation  Reliability:  good  Insight:  " Fair  Judgement:  Fair  Impulse Control:  Fair  Physical/Medical Issues:  No        Lab Results:   Admission on 04/02/2020   Component Date Value Ref Range Status   • HIV-1/ HIV-2 04/03/2020 Non-Reactive  Non-Reactive Final    A non-reactive test result does not preclude the possibility of exposure to HIV or infection with HIV. An antibody response to recent exposure may take several months to reach detectable levels.   • Hepatitis B Surface Ag 04/03/2020 Non-Reactive  Non-Reactive Final   • Hep A IgM 04/03/2020 Non-Reactive  Non-Reactive Final   • Hep B C IgM 04/03/2020 Non-Reactive  Non-Reactive Final   • Hepatitis C Ab 04/03/2020 Non-Reactive  Non-Reactive Final   • Extra Tube 04/03/2020 Hold for add-ons.   Final    Auto resulted.   Admission on 04/02/2020, Discharged on 04/02/2020   Component Date Value Ref Range Status   • Glucose 04/02/2020 99  65 - 99 mg/dL Final   • BUN 04/02/2020 8  6 - 20 mg/dL Final   • Creatinine 04/02/2020 0.90  0.76 - 1.27 mg/dL Final   • Sodium 04/02/2020 142  136 - 145 mmol/L Final   • Potassium 04/02/2020 4.5  3.5 - 5.2 mmol/L Final   • Chloride 04/02/2020 104  98 - 107 mmol/L Final   • CO2 04/02/2020 28.5  22.0 - 29.0 mmol/L Final   • Calcium 04/02/2020 9.3  8.6 - 10.5 mg/dL Final   • Total Protein 04/02/2020 7.1  6.0 - 8.5 g/dL Final   • Albumin 04/02/2020 4.20  3.50 - 5.20 g/dL Final   • ALT (SGPT) 04/02/2020 8  1 - 41 U/L Final   • AST (SGOT) 04/02/2020 15  1 - 40 U/L Final   • Alkaline Phosphatase 04/02/2020 59  39 - 117 U/L Final   • Total Bilirubin 04/02/2020 0.4  0.2 - 1.2 mg/dL Final   • eGFR Non African Amer 04/02/2020 98  >60 mL/min/1.73 Final   • Globulin 04/02/2020 2.9  gm/dL Final   • A/G Ratio 04/02/2020 1.4  g/dL Final   • BUN/Creatinine Ratio 04/02/2020 8.9  7.0 - 25.0 Final   • Anion Gap 04/02/2020 9.5  5.0 - 15.0 mmol/L Final   • Color, UA 04/02/2020 Yellow  Yellow, Straw Final   • Appearance, UA 04/02/2020 Cloudy* Clear Final   • pH, UA 04/02/2020 8.5* 5.0 - 8.0  Final   • Specific Gravity, UA 04/02/2020 1.017  1.005 - 1.030 Final   • Glucose, UA 04/02/2020 Negative  Negative Final   • Ketones, UA 04/02/2020 Negative  Negative Final   • Bilirubin, UA 04/02/2020 Negative  Negative Final   • Blood, UA 04/02/2020 Negative  Negative Final   • Protein, UA 04/02/2020 Negative  Negative Final   • Leuk Esterase, UA 04/02/2020 Trace* Negative Final   • Nitrite, UA 04/02/2020 Negative  Negative Final   • Urobilinogen, UA 04/02/2020 1.0 E.U./dL  0.2 - 1.0 E.U./dL Final   • Amphetamine Screen, Urine 04/02/2020 Negative  Negative Final   • Barbiturates Screen, Urine 04/02/2020 Negative  Negative Final   • Benzodiazepine Screen, Urine 04/02/2020 Positive* Negative Final   • Cocaine Screen, Urine 04/02/2020 Negative  Negative Final   • Methadone Screen, Urine 04/02/2020 Negative  Negative Final   • Opiate Screen 04/02/2020 Positive* Negative Final   • Phencyclidine (PCP), Urine 04/02/2020 Negative  Negative Final   • THC, Screen, Urine 04/02/2020 Negative  Negative Final   • 6-ACETYL MORPHINE 04/02/2020 Negative  Negative Final   • Buprenorphine, Screen, Urine 04/02/2020 Negative  Negative Final   • Oxycodone Screen, Urine 04/02/2020 Negative  Negative Final   • Magnesium 04/02/2020 2.1  1.6 - 2.6 mg/dL Final   • Ethanol 04/02/2020 <10  0 - 10 mg/dL Final   • Ethanol % 04/02/2020 <0.010  % Final   • WBC 04/02/2020 14.23* 3.40 - 10.80 10*3/mm3 Final   • RBC 04/02/2020 5.18  4.14 - 5.80 10*6/mm3 Final   • Hemoglobin 04/02/2020 15.7  13.0 - 17.7 g/dL Final   • Hematocrit 04/02/2020 45.9  37.5 - 51.0 % Final   • MCV 04/02/2020 88.6  79.0 - 97.0 fL Final   • MCH 04/02/2020 30.3  26.6 - 33.0 pg Final   • MCHC 04/02/2020 34.2  31.5 - 35.7 g/dL Final   • RDW 04/02/2020 12.6  12.3 - 15.4 % Final   • RDW-SD 04/02/2020 41.4  37.0 - 54.0 fl Final   • MPV 04/02/2020 9.4  6.0 - 12.0 fL Final   • Platelets 04/02/2020 356  140 - 450 10*3/mm3 Final   • Neutrophil % 04/02/2020 71.9  42.7 - 76.0 % Final   •  Lymphocyte % 04/02/2020 19.9  19.6 - 45.3 % Final   • Monocyte % 04/02/2020 5.3  5.0 - 12.0 % Final   • Eosinophil % 04/02/2020 2.0  0.3 - 6.2 % Final   • Basophil % 04/02/2020 0.6  0.0 - 1.5 % Final   • Immature Grans % 04/02/2020 0.3  0.0 - 0.5 % Final   • Neutrophils, Absolute 04/02/2020 10.23* 1.70 - 7.00 10*3/mm3 Final   • Lymphocytes, Absolute 04/02/2020 2.83  0.70 - 3.10 10*3/mm3 Final   • Monocytes, Absolute 04/02/2020 0.76  0.10 - 0.90 10*3/mm3 Final   • Eosinophils, Absolute 04/02/2020 0.29  0.00 - 0.40 10*3/mm3 Final   • Basophils, Absolute 04/02/2020 0.08  0.00 - 0.20 10*3/mm3 Final   • Immature Grans, Absolute 04/02/2020 0.04  0.00 - 0.05 10*3/mm3 Final   • nRBC 04/02/2020 0.0  0.0 - 0.2 /100 WBC Final   • RBC, UA 04/02/2020 3-5* None Seen, 0-2 /HPF Final   • WBC, UA 04/02/2020 0-2  None Seen, 0-2 /HPF Final   • Bacteria, UA 04/02/2020 None Seen  None Seen /HPF Final   • Squamous Epithelial Cells, UA 04/02/2020 0-2  None Seen, 0-2 /HPF Final   • Hyaline Casts, UA 04/02/2020 None Seen  None Seen /LPF Final   • Methodology 04/02/2020 Automated Microscopy   Final       Assessment/Plan       Opioid use disorder, severe, dependence (CMS/HCC)  - Clonidine detox with COWS withdrawal checks  - PRN comfort medications available        Severe benzodiazepine use disorder (CMS/HCC)  - Ativan detox with CIWA withdrawal checks  - PRN comfort medications available      Patient denies any major symptom burden from withdrawal today.  He is tolerating detox well.  He is in good spirits and reports that his mood is good.     The patient has been admitted for safety and stabilization.  Patient will be monitored for suicidality daily and maintained on Special Precautions Level 4 (q30 min checks).  The patient will have individual and group therapy with a master's level therapist. A master treatment plan will be developed and agreed upon by the patient and his/her treatment team.  The patient's estimated length of stay in  the hospital is 5-7 days.     This document has been electronically signed by Tony Enciso MD  April 4, 2020 12:32

## 2020-04-04 NOTE — PLAN OF CARE
Pt reports leg cramps, body aches, anxiety 5/10 and depression 7/10.  Tremors +1, tachycardia, and elevated blood pressure. Pt slept well, appetite is good, and participated in group.

## 2020-04-05 PROCEDURE — 99232 SBSQ HOSP IP/OBS MODERATE 35: CPT | Performed by: PSYCHIATRY & NEUROLOGY

## 2020-04-05 RX ADMIN — IBUPROFEN 400 MG: 400 TABLET, FILM COATED ORAL at 08:08

## 2020-04-05 RX ADMIN — LORAZEPAM 1 MG: 1 TABLET ORAL at 21:06

## 2020-04-05 RX ADMIN — HYDROXYZINE HYDROCHLORIDE 50 MG: 50 TABLET ORAL at 21:06

## 2020-04-05 RX ADMIN — LORAZEPAM 1 MG: 1 TABLET ORAL at 08:08

## 2020-04-05 RX ADMIN — CYCLOBENZAPRINE HYDROCHLORIDE 10 MG: 10 TABLET, FILM COATED ORAL at 08:08

## 2020-04-05 RX ADMIN — TRAZODONE HYDROCHLORIDE 50 MG: 50 TABLET ORAL at 21:06

## 2020-04-05 RX ADMIN — LORAZEPAM 1 MG: 1 TABLET ORAL at 14:00

## 2020-04-05 NOTE — PLAN OF CARE
Problem: Patient Care Overview  Goal: Plan of Care Review  Outcome: Ongoing (interventions implemented as appropriate)  Flowsheets (Taken 4/5/2020 2067)  Progress: improving  Plan of Care Reviewed With: patient  Patient Agreement with Plan of Care: agrees  Outcome Summary: Pt alert and oriented; reports good appetite, difficulty staying asleep; rates depression 3; denies cravings; w/d symptoms of diarrhea, muscle and joint aches; pt calm and cooperative; up for free time, group, snack; educated on proper handwashing and social distancing; pt verbalized understanding; pt sleeping well throughout the night.

## 2020-04-05 NOTE — PLAN OF CARE
Problem: Patient Care Overview  Goal: Plan of Care Review  Outcome: Ongoing (interventions implemented as appropriate)  Flowsheets  Taken 4/3/2020 1242 by Antonieta Molina  Consent Given to Review Plan with: Sanjuanita Segura  Taken 4/5/2020 0516 by Fallon Underwood, RN  Progress: improving  Outcome Summary: Pt alert and oriented; reports good appetite, difficulty staying asleep; rates depression 3; denies cravings; w/d symptoms of diarrhea, muscle and joint aches; pt calm and cooperative; up for free time, group, snack;up for briefly for snack, meds, group, educated on proper handwashing and social distancing; pt verbalized understanding; pt sleeping well throughout the night.  Taken 4/5/2020 0745 by Darrian Velasco, RN  Plan of Care Reviewed With: patient  Patient Agreement with Plan of Care: agrees

## 2020-04-05 NOTE — PROGRESS NOTES
"Subjective   Cecil Ugalde is a 34 y.o. male who presents today for hospital follow up    Chief Complaint: Polysubstance abuse    History of Present Illness: Patient is a 15-year-old  male currently admitted with complaints of polysubstance abuse requesting detox from benzodiazepines and opioids.  I evaluated the patient at bedside today.  He states that he is doing, \"pretty good.\"  He states that he is tired because he kept waking up at night.  He denies any withdrawal symptoms.  He denies any issues with appetite.  He denies any medication side effects.  He denies HI/SI/AVH.    The following portions of the patient's history were reviewed and updated as appropriate: allergies, current medications, past family history, past medical history, past social history, past surgical history and problem list.      Past Medical History:  Past Medical History:   Diagnosis Date   • Arrhythmia    • Chest pain    • Fatigue    • Gastroenteritis    • Hypertension    • Substance abuse (CMS/[facility])        Social History:  Social History     Socioeconomic History   • Marital status:      Spouse name: Not on file   • Number of children: Not on file   • Years of education: Not on file   • Highest education level: Not on file   Tobacco Use   • Smoking status: Current Every Day Smoker     Packs/day: 1.00     Years: 17.00     Pack years: 17.00     Types: Cigarettes   • Smokeless tobacco: Never Used   • Tobacco comment: smoking since 17 years old   Substance and Sexual Activity   • Alcohol use: Not Currently   • Drug use: Yes     Types: Benzodiazepines, Heroin, Oxycodone   • Sexual activity: Not Currently     Partners: Female       Family History:  No family history on file.    Past Surgical History:  Past Surgical History:   Procedure Laterality Date   • URETEROSCOPY LASER LITHOTRIPSY WITH STENT INSERTION         Problem List:  Patient Active Problem List   Diagnosis   • Opioid use disorder, severe, dependence (CMS/[facility])   • " Severe benzodiazepine use disorder (CMS/HCC)       Allergy:   No Known Allergies     Current Medications:   Current Facility-Administered Medications   Medication Dose Route Frequency Provider Last Rate Last Dose   • acetaminophen (TYLENOL) tablet 650 mg  650 mg Oral Q6H PRN Nick Nuno MD   650 mg at 04/04/20 1240   • aluminum-magnesium hydroxide-simethicone (MAALOX MAX) 400-400-40 MG/5ML suspension 15 mL  15 mL Oral Q6H PRN Nick Nuno MD       • benzonatate (TESSALON) capsule 100 mg  100 mg Oral TID PRN Nick Nuno MD       • benztropine (COGENTIN) tablet 2 mg  2 mg Oral Once PRN Nick Nuno MD        Or   • benztropine (COGENTIN) injection 1 mg  1 mg Intramuscular Once PRN Nick Nuno MD       • cloNIDine (CATAPRES) tablet 0.1 mg  0.1 mg Oral BID PRN Nick Nuno MD        Followed by   • [START ON 4/6/2020] cloNIDine (CATAPRES) tablet 0.1 mg  0.1 mg Oral Daily PRN Nick Nuno MD       • cyclobenzaprine (FLEXERIL) tablet 10 mg  10 mg Oral TID PRN Nick Nuno MD   10 mg at 04/05/20 0808   • dicyclomine (BENTYL) capsule 10 mg  10 mg Oral TID PRN Nick Nuno MD       • famotidine (PEPCID) tablet 20 mg  20 mg Oral BID PRN Nick Nuno MD       • hydrOXYzine (ATARAX) tablet 50 mg  50 mg Oral Q6H PRN Nick Nuno MD   50 mg at 04/04/20 2101   • ibuprofen (ADVIL,MOTRIN) tablet 400 mg  400 mg Oral Q6H PRN Nick Nuno MD   400 mg at 04/05/20 0808   • loperamide (IMODIUM) capsule 2 mg  2 mg Oral Q2H PRN Nick Nuno MD   2 mg at 04/04/20 2036   • LORazepam (ATIVAN) tablet 1 mg  1 mg Oral 3 times per day Nick Nuno MD   1 mg at 04/05/20 0808    Followed by   • [START ON 4/6/2020] LORazepam (ATIVAN) tablet 0.5 mg  0.5 mg Oral 3 times per day Nick Nuno MD       • LORazepam (ATIVAN) tablet 1 mg  1 mg Oral Q4H PRN Nick Nuno MD        Followed by   • [START ON 4/6/2020] LORazepam (ATIVAN) tablet 0.5 mg  0.5 mg Oral Q4H PRN Nick Nuno MD      "  • magnesium hydroxide (MILK OF MAGNESIA) suspension 2400 mg/10mL 10 mL  10 mL Oral Daily PRN Nick Nuno MD       • nicotine (NICODERM CQ) 21 MG/24HR patch 1 patch  1 patch Transdermal Q24H Nick Nuno MD       • ondansetron (ZOFRAN) tablet 4 mg  4 mg Oral Q6H PRN Nick Nuno MD       • sodium chloride nasal spray 2 spray  2 spray Each Nare PRN Nick Nuno MD       • traZODone (DESYREL) tablet 50 mg  50 mg Oral Nightly PRN Nick Nuno MD   50 mg at 04/04/20 2101       Review of Symptoms:    Review of Systems   Constitutional: Positive for fatigue.   HENT: Negative.    Eyes: Negative.    Respiratory: Negative.    Cardiovascular: Negative.    Gastrointestinal: Negative.    Endocrine: Negative.    Genitourinary: Negative.    Musculoskeletal: Negative.    Skin: Negative.    Allergic/Immunologic: Negative.    Neurological: Negative.    Hematological: Negative.    Psychiatric/Behavioral: Positive for stress.         Physical Exam:   Blood pressure 148/90, pulse 86, temperature 97.4 °F (36.3 °C), temperature source Temporal, resp. rate 18, height 180.3 cm (71\"), weight 84.8 kg (187 lb), SpO2 97 %.    Appearance: Patient male of stated age in no acute distress  Gait, Station, Strength: Within normal limits    Mental Status Exam:     Mental Status exam performed 4/5/2020 and patient shows no significant changes from previous exam.    Hygiene:   good  Cooperation:  Cooperative  Eye Contact:  Good  Psychomotor Behavior:  Appropriate  Affect:  Full range  Mood: normal  Hopelessness: Denies  Speech:  Normal  Thought Process:  Goal directed and Linear  Thought Content:  Normal  Suicidal:  None  Homicidal:  None  Hallucinations:  None  Delusion:  None  Memory:  Intact  Orientation:  Person, Place, Time and Situation  Reliability:  good  Insight:  Fair  Judgement:  Fair  Impulse Control:  Fair  Physical/Medical Issues:  No        Lab Results:   Admission on 04/02/2020   Component Date Value Ref Range " Status   • HIV-1/ HIV-2 04/03/2020 Non-Reactive  Non-Reactive Final    A non-reactive test result does not preclude the possibility of exposure to HIV or infection with HIV. An antibody response to recent exposure may take several months to reach detectable levels.   • Hepatitis B Surface Ag 04/03/2020 Non-Reactive  Non-Reactive Final   • Hep A IgM 04/03/2020 Non-Reactive  Non-Reactive Final   • Hep B C IgM 04/03/2020 Non-Reactive  Non-Reactive Final   • Hepatitis C Ab 04/03/2020 Non-Reactive  Non-Reactive Final   • Extra Tube 04/03/2020 Hold for add-ons.   Final    Auto resulted.   Admission on 04/02/2020, Discharged on 04/02/2020   Component Date Value Ref Range Status   • Glucose 04/02/2020 99  65 - 99 mg/dL Final   • BUN 04/02/2020 8  6 - 20 mg/dL Final   • Creatinine 04/02/2020 0.90  0.76 - 1.27 mg/dL Final   • Sodium 04/02/2020 142  136 - 145 mmol/L Final   • Potassium 04/02/2020 4.5  3.5 - 5.2 mmol/L Final   • Chloride 04/02/2020 104  98 - 107 mmol/L Final   • CO2 04/02/2020 28.5  22.0 - 29.0 mmol/L Final   • Calcium 04/02/2020 9.3  8.6 - 10.5 mg/dL Final   • Total Protein 04/02/2020 7.1  6.0 - 8.5 g/dL Final   • Albumin 04/02/2020 4.20  3.50 - 5.20 g/dL Final   • ALT (SGPT) 04/02/2020 8  1 - 41 U/L Final   • AST (SGOT) 04/02/2020 15  1 - 40 U/L Final   • Alkaline Phosphatase 04/02/2020 59  39 - 117 U/L Final   • Total Bilirubin 04/02/2020 0.4  0.2 - 1.2 mg/dL Final   • eGFR Non African Amer 04/02/2020 98  >60 mL/min/1.73 Final   • Globulin 04/02/2020 2.9  gm/dL Final   • A/G Ratio 04/02/2020 1.4  g/dL Final   • BUN/Creatinine Ratio 04/02/2020 8.9  7.0 - 25.0 Final   • Anion Gap 04/02/2020 9.5  5.0 - 15.0 mmol/L Final   • Color, UA 04/02/2020 Yellow  Yellow, Straw Final   • Appearance, UA 04/02/2020 Cloudy* Clear Final   • pH, UA 04/02/2020 8.5* 5.0 - 8.0 Final   • Specific Gravity, UA 04/02/2020 1.017  1.005 - 1.030 Final   • Glucose, UA 04/02/2020 Negative  Negative Final   • Ketones, UA 04/02/2020 Negative   Negative Final   • Bilirubin, UA 04/02/2020 Negative  Negative Final   • Blood, UA 04/02/2020 Negative  Negative Final   • Protein, UA 04/02/2020 Negative  Negative Final   • Leuk Esterase, UA 04/02/2020 Trace* Negative Final   • Nitrite, UA 04/02/2020 Negative  Negative Final   • Urobilinogen, UA 04/02/2020 1.0 E.U./dL  0.2 - 1.0 E.U./dL Final   • Amphetamine Screen, Urine 04/02/2020 Negative  Negative Final   • Barbiturates Screen, Urine 04/02/2020 Negative  Negative Final   • Benzodiazepine Screen, Urine 04/02/2020 Positive* Negative Final   • Cocaine Screen, Urine 04/02/2020 Negative  Negative Final   • Methadone Screen, Urine 04/02/2020 Negative  Negative Final   • Opiate Screen 04/02/2020 Positive* Negative Final   • Phencyclidine (PCP), Urine 04/02/2020 Negative  Negative Final   • THC, Screen, Urine 04/02/2020 Negative  Negative Final   • 6-ACETYL MORPHINE 04/02/2020 Negative  Negative Final   • Buprenorphine, Screen, Urine 04/02/2020 Negative  Negative Final   • Oxycodone Screen, Urine 04/02/2020 Negative  Negative Final   • Magnesium 04/02/2020 2.1  1.6 - 2.6 mg/dL Final   • Ethanol 04/02/2020 <10  0 - 10 mg/dL Final   • Ethanol % 04/02/2020 <0.010  % Final   • WBC 04/02/2020 14.23* 3.40 - 10.80 10*3/mm3 Final   • RBC 04/02/2020 5.18  4.14 - 5.80 10*6/mm3 Final   • Hemoglobin 04/02/2020 15.7  13.0 - 17.7 g/dL Final   • Hematocrit 04/02/2020 45.9  37.5 - 51.0 % Final   • MCV 04/02/2020 88.6  79.0 - 97.0 fL Final   • MCH 04/02/2020 30.3  26.6 - 33.0 pg Final   • MCHC 04/02/2020 34.2  31.5 - 35.7 g/dL Final   • RDW 04/02/2020 12.6  12.3 - 15.4 % Final   • RDW-SD 04/02/2020 41.4  37.0 - 54.0 fl Final   • MPV 04/02/2020 9.4  6.0 - 12.0 fL Final   • Platelets 04/02/2020 356  140 - 450 10*3/mm3 Final   • Neutrophil % 04/02/2020 71.9  42.7 - 76.0 % Final   • Lymphocyte % 04/02/2020 19.9  19.6 - 45.3 % Final   • Monocyte % 04/02/2020 5.3  5.0 - 12.0 % Final   • Eosinophil % 04/02/2020 2.0  0.3 - 6.2 % Final   •  Basophil % 04/02/2020 0.6  0.0 - 1.5 % Final   • Immature Grans % 04/02/2020 0.3  0.0 - 0.5 % Final   • Neutrophils, Absolute 04/02/2020 10.23* 1.70 - 7.00 10*3/mm3 Final   • Lymphocytes, Absolute 04/02/2020 2.83  0.70 - 3.10 10*3/mm3 Final   • Monocytes, Absolute 04/02/2020 0.76  0.10 - 0.90 10*3/mm3 Final   • Eosinophils, Absolute 04/02/2020 0.29  0.00 - 0.40 10*3/mm3 Final   • Basophils, Absolute 04/02/2020 0.08  0.00 - 0.20 10*3/mm3 Final   • Immature Grans, Absolute 04/02/2020 0.04  0.00 - 0.05 10*3/mm3 Final   • nRBC 04/02/2020 0.0  0.0 - 0.2 /100 WBC Final   • RBC, UA 04/02/2020 3-5* None Seen, 0-2 /HPF Final   • WBC, UA 04/02/2020 0-2  None Seen, 0-2 /HPF Final   • Bacteria, UA 04/02/2020 None Seen  None Seen /HPF Final   • Squamous Epithelial Cells, UA 04/02/2020 0-2  None Seen, 0-2 /HPF Final   • Hyaline Casts, UA 04/02/2020 None Seen  None Seen /LPF Final   • Methodology 04/02/2020 Automated Microscopy   Final       Assessment/Plan       Opioid use disorder, severe, dependence (CMS/[facility])  - Clonidine detox with COWS withdrawal checks  - PRN comfort medications available        Severe benzodiazepine use disorder (CMS/[facility])  - Ativan detox with CIWA withdrawal checks  - PRN comfort medications available      Continues to tolerate detox well.  No major withdrawal symptoms.  Some disruption to sleep.     The patient has been admitted for safety and stabilization.  Patient will be monitored for suicidality daily and maintained on Special Precautions Level 4 (q30 min checks).  The patient will have individual and group therapy with a master's level therapist. A master treatment plan will be developed and agreed upon by the patient and his/her treatment team.  The patient's estimated length of stay in the hospital is 5-7 days.     This document has been electronically signed by Tony Enciso MD  April 5, 2020 10:50

## 2020-04-06 PROCEDURE — 99232 SBSQ HOSP IP/OBS MODERATE 35: CPT | Performed by: PSYCHIATRY & NEUROLOGY

## 2020-04-06 RX ADMIN — LORAZEPAM 0.5 MG: 0.5 TABLET ORAL at 08:28

## 2020-04-06 RX ADMIN — TRAZODONE HYDROCHLORIDE 50 MG: 50 TABLET ORAL at 21:24

## 2020-04-06 RX ADMIN — LORAZEPAM 0.5 MG: 0.5 TABLET ORAL at 21:25

## 2020-04-06 RX ADMIN — HYDROXYZINE HYDROCHLORIDE 50 MG: 50 TABLET ORAL at 21:25

## 2020-04-06 NOTE — PLAN OF CARE
PT DENIES ANY ANXIETY, DEPRESSION, SI, HI, OR CRAVING/WITHDRAWALS. PT IS COOPERATIVE AND CALM THIS SHIFT.

## 2020-04-06 NOTE — PLAN OF CARE
"  Problem: Patient Care Overview  Goal: Interprofessional Rounds/Family Conf  Flowsheets (Taken 4/6/2020 1419)  Participants: milieu/psych techs; nursing; social work  Summary: Staffed with DANAY Sherman and FARZANA.     Data:     Covering therapist met with patient, who was agreeable, for individual session.  Continued to discuss progress and treatment goals.  Educated patient about importance of safety and aftercare plans.    Patient discussed feeling much improved today with minimal withdrawal symptoms.  He discussed avoiding old friends and listening to music as healthy coping skills.  He identified mother as positive support.  Patient reported plan to attend [facility] upon discharge tomorrow.  [facility] and update about anticipated discharge tomorrow.  Therapist attempted to contact patient's mother Bozena many times at [phone] and did not get answer today.  Patient continues hospitalization.  He denied concerns.    Assessment:    Patient is observed to display appropriate affect and \"okay\" mood.  Patient denied SI, HI, and AVH.  Patient oriented x3 with limited insight.  He reported fair sleep and fair appetite.    Plan:    Therapist will continue to assist daily with aftercare and safety planning as needed.  Patient continues hospitalization.  He is accepted to [facility] tomorrow.  He reports his mother will transport.    "

## 2020-04-06 NOTE — PLAN OF CARE
Problem: Patient Care Overview  Goal: Plan of Care Review  Outcome: Ongoing (interventions implemented as appropriate)  Flowsheets (Taken 4/5/2020 2024)  Progress: improving  Plan of Care Reviewed With: patient  Patient Agreement with Plan of Care: agrees  Note:   Patient denies anxiety and depression. He denies si, hi, and avh. He is calm and cooperative. Interacting with peers in day room. He denies any w/d symptoms or cravings.

## 2020-04-06 NOTE — PROGRESS NOTES
"INPATIENT PSYCHIATRIC PROGRESS NOTE    Name:  Cecil Ugalde  :  1985  MRN:  [id number]  Visit Number:  [id number]  Length of stay:  4    SUBJECTIVE  CC/Focus of Exam: Polysubstance use    INTERVAL HISTORY:  The patient reports he is feeling better and detox medications are helping.   Depression rating 0/10  Anxiety rating 0/10  Sleep: good  Withdrawal sx: denies  Cravin/10    Review of Systems   Respiratory: Negative.    Cardiovascular: Negative.    Gastrointestinal: Negative.        OBJECTIVE    Temp:  [97.5 °F (36.4 °C)-98.8 °F (37.1 °C)] 97.7 °F (36.5 °C)  Heart Rate:  [77-98] 82  Resp:  [18] 18  BP: (115-163)/() 135/75    MENTAL STATUS EXAM:  Appearance:Casually dressed, good hygeine.   Cooperation:Cooperative  Psychomotor: No psychomotor agitation/retardation, No EPS, No motor tics  Speech-normal rate, amount.  Mood \"good\"   Affect-congruent, appropriate, stable  Thought Content-goal directed, no delusional material present  Thought process-linear, organized.  Suicidality: No SI  Homicidality: No HI  Perception: No AH/VH  Insight-fair   Judgement-fair    Lab Results (last 24 hours)     ** No results found for the last 24 hours. **             Imaging Results (Last 24 Hours)     ** No results found for the last 24 hours. **             ECG/EMG Results (most recent)     Procedure Component Value Units Date/Time    ECG 12 Lead [[id number]] Collected:  20 1745     Updated:  20 1016    Narrative:       Test Reason : Baseline Cardiac Status  Blood Pressure : **/** mmHG  Vent. Rate : 088 BPM     Atrial Rate : 088 BPM     P-R Int : 142 ms          QRS Dur : 096 ms      QT Int : 362 ms       P-R-T Axes : 069 068 045 degrees     QTc Int : 438 ms    Normal sinus rhythm  Normal ECG  No previous ECGs available  Confirmed by Fernando Foster () on 4/3/2020 10:16:05 AM    Referred By:             Confirmed By:Fernando Foster           ALLERGIES: Patient has no known allergies.      Current " Facility-Administered Medications:   •  acetaminophen (TYLENOL) tablet 650 mg, 650 mg, Oral, Q6H PRN, Nick Nuno MD, 650 mg at 20 1240  •  aluminum-magnesium hydroxide-simethicone (MAALOX MAX) 400-400-40 MG/5ML suspension 15 mL, 15 mL, Oral, Q6H PRN, Nick Nuno MD  •  benzonatate (TESSALON) capsule 100 mg, 100 mg, Oral, TID PRN, Nick Nuno MD  •  benztropine (COGENTIN) tablet 2 mg, 2 mg, Oral, Once PRN **OR** benztropine (COGENTIN) injection 1 mg, 1 mg, Intramuscular, Once PRN, Nick Nuno MD  •  [] cloNIDine (CATAPRES) tablet 0.1 mg, 0.1 mg, Oral, 4x Daily PRN, 0.1 mg at 20 **FOLLOWED BY** [] cloNIDine (CATAPRES) tablet 0.1 mg, 0.1 mg, Oral, TID PRN, 0.1 mg at 20 **FOLLOWED BY** [] cloNIDine (CATAPRES) tablet 0.1 mg, 0.1 mg, Oral, BID PRN **FOLLOWED BY** cloNIDine (CATAPRES) tablet 0.1 mg, 0.1 mg, Oral, Daily PRN, Nick Nuno MD  •  cyclobenzaprine (FLEXERIL) tablet 10 mg, 10 mg, Oral, TID PRN, Nick Nuno MD, 10 mg at 20 0808  •  dicyclomine (BENTYL) capsule 10 mg, 10 mg, Oral, TID PRN, Nick Nuno MD  •  famotidine (PEPCID) tablet 20 mg, 20 mg, Oral, BID PRN, Nick Nuno MD  •  hydrOXYzine (ATARAX) tablet 50 mg, 50 mg, Oral, Q6H PRN, Nick Nuno MD, 50 mg at 20 2106  •  ibuprofen (ADVIL,MOTRIN) tablet 400 mg, 400 mg, Oral, Q6H PRN, Nick Nuno MD, 400 mg at 20 0808  •  loperamide (IMODIUM) capsule 2 mg, 2 mg, Oral, Q2H PRN, Nick Nuno MD, 2 mg at 20  •  [COMPLETED] LORazepam (ATIVAN) tablet 2 mg, 2 mg, Oral, 3 times per day, 2 mg at 20 **FOLLOWED BY** [COMPLETED] LORazepam (ATIVAN) tablet 1.5 mg, 1.5 mg, Oral, 3 times per day, 1.5 mg at 20 **FOLLOWED BY** [COMPLETED] LORazepam (ATIVAN) tablet 1 mg, 1 mg, Oral, 3 times per day, 1 mg at 20 **FOLLOWED BY** LORazepam (ATIVAN) tablet 0.5 mg, 0.5 mg, Oral, 3 times per day, Nick Nuno MD, 0.5 mg  at 20 0828  •  [] LORazepam (ATIVAN) tablet 2 mg, 2 mg, Oral, Q4H PRN **FOLLOWED BY** [] LORazepam (ATIVAN) tablet 1.5 mg, 1.5 mg, Oral, Q4H PRN **FOLLOWED BY** [] LORazepam (ATIVAN) tablet 1 mg, 1 mg, Oral, Q4H PRN **FOLLOWED BY** LORazepam (ATIVAN) tablet 0.5 mg, 0.5 mg, Oral, Q4H PRN, Nick Nuno MD  •  magnesium hydroxide (MILK OF MAGNESIA) suspension 2400 mg/10mL 10 mL, 10 mL, Oral, Daily PRN, Nick Nuno MD  •  nicotine (NICODERM CQ) 21 MG/24HR patch 1 patch, 1 patch, Transdermal, Q24H, Nick Nuno MD  •  ondansetron (ZOFRAN) tablet 4 mg, 4 mg, Oral, Q6H PRN, Nick Nuno MD  •  sodium chloride nasal spray 2 spray, 2 spray, Each Nare, PRN, Nick Nuno MD  •  traZODone (DESYREL) tablet 50 mg, 50 mg, Oral, Nightly PRN, Nick Nuno MD, 50 mg at 20 2106    ASSESSMENT & PLAN:      Opioid use disorder, severe, dependence (CMS/HCC)  - Clonidine detox      Severe benzodiazepine use disorder (CMS/HCC)  - Ativan detox    Plan discharge tomorrow.    Special precautions: Special Precautions Level 4 (q30 min checks).    Behavioral Health Treatment Plan and Problem List: I have reviewed and approved the Behavioral Health Treatment Plan and Problem list.  The patient has had a chance to review and agrees with the treatment plan.     Clinician:  Hever Cazares MD  20  10:43

## 2020-04-07 VITALS
OXYGEN SATURATION: 98 % | DIASTOLIC BLOOD PRESSURE: 89 MMHG | BODY MASS INDEX: 27.16 KG/M2 | RESPIRATION RATE: 16 BRPM | SYSTOLIC BLOOD PRESSURE: 141 MMHG | HEIGHT: 71 IN | HEART RATE: 92 BPM | WEIGHT: 194 LBS | TEMPERATURE: 97.5 F

## 2020-04-07 PROCEDURE — 99238 HOSP IP/OBS DSCHRG MGMT 30/<: CPT | Performed by: PSYCHIATRY & NEUROLOGY

## 2020-04-07 NOTE — PLAN OF CARE
Problem: Patient Care Overview  Goal: Plan of Care Review  Outcome: Ongoing (interventions implemented as appropriate)  Flowsheets  Taken 4/7/2020 0051  Consent Given to Review Plan with: Pt reports he is being discharged in am . He states he will be going to [facility]  Progress: improving  Taken 4/6/2020 2201  Plan of Care Reviewed With: patient  Patient Agreement with Plan of Care: agrees

## 2020-04-07 NOTE — PLAN OF CARE
Problem: Patient Care Overview  Goal: Interprofessional Rounds/Family Conf  Outcome: Ongoing (interventions implemented as appropriate)  Flowsheets (Taken 4/7/2020 1037)  Participants: psychiatrist; milieu/psych techs; family; nursing; patient; social work  Summary: Treatment team staffing and patient evaluation    1030:       DATA:      This provider is located at [facility], and the Patient  is seen remotely at [facility] using [facility]. The patient's condition being treated is appropriate for telehealth services. The provider identified herself as well as her credentials.   The patient has given consent to be seen remotely, and when consent is given they understand that the consent allows for patient identifiable information to be sent to a third party as needed.   They may refuse to be seen remotely at any time. The electronic data is encrypted and password protected, and the patient has been advised of the potential risks to privacy not withstanding such measures.      Therapist staffed case with Dr. Cazares and patient's mother Sanjuanita. Sanjuanita requests [facility] transport the patient this date as she appears concerned that patient will not want to comply with rehab if she picks him up. Therapist contacted [facility] and staff plan to pick patient up at 2:00 p.m. Today.  Patient denies SI/HI and acute symptoms. Patient is future oriented and identifies his girls as motivators. He lists music as a coping skill.  Patient denies other needs this date and is agreeable to [facility] transporting.     Assisted patient in identifying risk factors which would indicate the need for higher level of care including thoughts to harm self or others and/or self-harming behavior and encouraged patient to contact this office, call 911, or present to the nearest emergency room should any of these events occur. Discussed crisis intervention services and means to access.  Patient  adamantly and convincingly denies current suicidal or homicidal ideation or perceptual disturbance.     Patient has been educated on the importance of social distancing and regular hand washing due to COVID19. Provided literature on healthy coping during social isolation.

## 2020-04-07 NOTE — DISCHARGE SUMMARY
"PSYCHIATRIC DISCHARGE SUMMARY     Patient Identification:  Name:  Cecil Ugalde  Age:  34 y.o.  Sex:  male  :  1985  MRN:  [id number]  Visit Number:  [id number]      Date of Admission:2020   Date of Discharge:  2020    Discharge Diagnosis:  Active Problems:    Opioid use disorder, severe, dependence (CMS/HCC)    Severe benzodiazepine use disorder (CMS/HCC)        Admission Diagnosis:  Polysubstance abuse (CMS/HCC) [F19.10]     Hospital Course  Patient is a 34 y.o. male presented with opioid and benzo use and withdrawals. The patient was admitted to the [facility] detox recovery unit for safety, further evaluation and treatment.  The patient was treated with Ativan and clonidine detox regimens and he was able to complete the treatment without any complications.  The patient was also able to take part in individual and group counseling sessions and work on appropriate coping skills.  The patient made steady improvement in his3 mood and expressed feeling more positive and hopeful about future. Sleep and appetite were improved.  The day of discharge the patient was calm, cooperative and pleasant. Mood was reported to be good, and denied SI/HI/AVH.     Mental Status Exam upon discharge:   Mood \"good\"   Affect-congruent, appropriate, stable  Thought Content-goal directed, no delusional material present  Thought process-linear, organized.  Suicidality: No SI  Homicidality: No HI  Perception: No AH/VH    Procedures Performed         Consults:   Consults     No orders found from 3/4/2020 to 4/3/2020.          Pertinent Test Results:   Lab Results (last 7 days)     Procedure Component Value Units Date/Time    Hepatitis Panel, Acute [[id number]] Collected:  20 1023    Specimen:  Blood Updated:  20    Narrative:       The following orders were created for panel order Hepatitis Panel, Acute.  Procedure                               Abnormality         Status                     ---------      "                          -----------         ------                     Hepatitis Panel, Acute[[id number]]       Normal              Final result               Hep B Confirmation Tube[[id number]]                          Final result                 Please view results for these tests on the individual orders.    Hep B Confirmation Tube [[id number]] Collected:  04/03/20 1023    Specimen:  Blood Updated:  04/03/20 2230     Extra Tube Hold for add-ons.     Comment: Auto resulted.       HIV-1 / O / 2 Ag / Antibody 4th Generation [[id number]]  (Normal) Collected:  04/03/20 1023    Specimen:  Blood Updated:  04/03/20 1236     HIV-1/ HIV-2 Non-Reactive     Comment: A non-reactive test result does not preclude the possibility of exposure to HIV or infection with HIV. An antibody response to recent exposure may take several months to reach detectable levels.       Narrative:       The HIV antibody/antigen combo assay is a qualitative assay for HIV that includes the p24 antigen as well as antibodies to HIV types 1 and 2. This test is intended to be used as a screening assay in the diagnosis of HIV infection in patients over the age of 2.  Results may be falsely decreased if patient taking Biotin.      Hepatitis Panel, Acute [[id number]]  (Normal) Collected:  04/03/20 1023    Specimen:  Blood Updated:  04/03/20 1226     Hepatitis B Surface Ag Non-Reactive     Hep A IgM Non-Reactive     Hep B C IgM Non-Reactive     Hepatitis C Ab Non-Reactive    Narrative:       Results may be falsely decreased if patient taking Biotin.           Condition on Discharge:  improved    Vital Signs  Temp:  [97.5 °F (36.4 °C)-98.9 °F (37.2 °C)] 97.5 °F (36.4 °C)  Heart Rate:  [90-99] 92  Resp:  [16-18] 16  BP: (131-156)/(73-91) 141/89      Discharge Disposition:  Home or Self Care    Discharge Medications:     Discharge Medications      Patient Not Prescribed Medications Upon Discharge         Discharge Diet: Regular    Activity at Discharge: As  tolerated    Follow-up Appointments  [place]      Test Results Pending at Discharge      Clinician:   Hever Cazares MD  04/07/20  13:03

## 2020-09-23 ENCOUNTER — TRANSCRIBE ORDERS (OUTPATIENT)
Dept: ADMINISTRATIVE | Facility: HOSPITAL | Age: 35
End: 2020-09-23

## 2020-09-23 DIAGNOSIS — R19.7 DIARRHEA OF PRESUMED INFECTIOUS ORIGIN: Primary | ICD-10-CM

## 2020-10-08 ENCOUNTER — APPOINTMENT (OUTPATIENT)
Dept: ULTRASOUND IMAGING | Facility: HOSPITAL | Age: 35
End: 2020-10-08

## 2020-10-15 ENCOUNTER — HOSPITAL ENCOUNTER (OUTPATIENT)
Dept: ULTRASOUND IMAGING | Facility: HOSPITAL | Age: 35
End: 2020-10-15

## 2020-10-30 ENCOUNTER — HOSPITAL ENCOUNTER (OUTPATIENT)
Dept: ULTRASOUND IMAGING | Facility: HOSPITAL | Age: 35
End: 2020-10-30

## 2021-03-23 ENCOUNTER — BULK ORDERING (OUTPATIENT)
Dept: CASE MANAGEMENT | Facility: OTHER | Age: 36
End: 2021-03-23

## 2021-03-23 DIAGNOSIS — Z23 IMMUNIZATION DUE: ICD-10-CM

## 2022-01-06 ENCOUNTER — TRANSCRIBE ORDERS (OUTPATIENT)
Dept: ADMINISTRATIVE | Facility: HOSPITAL | Age: 37
End: 2022-01-06

## 2022-01-06 DIAGNOSIS — R10.11 RUQ PAIN: Primary | ICD-10-CM

## 2022-01-11 PROBLEM — D58.2 ELEVATED HEMOGLOBIN (HCC): Status: ACTIVE | Noted: 2022-01-11

## 2022-01-11 RX ORDER — SODIUM CHLORIDE 9 MG/ML
250 INJECTION, SOLUTION INTRAVENOUS ONCE
Status: CANCELLED | OUTPATIENT
Start: 2022-01-17

## 2022-01-18 ENCOUNTER — HOSPITAL ENCOUNTER (OUTPATIENT)
Dept: INFUSION THERAPY | Facility: HOSPITAL | Age: 37
Setting detail: INFUSION SERIES
Discharge: HOME | End: 2022-01-18

## 2022-01-18 VITALS
SYSTOLIC BLOOD PRESSURE: 149 MMHG | DIASTOLIC BLOOD PRESSURE: 99 MMHG | OXYGEN SATURATION: 98 % | TEMPERATURE: 98.4 F | RESPIRATION RATE: 20 BRPM | HEART RATE: 85 BPM

## 2022-01-18 DIAGNOSIS — D58.2 ELEVATED HEMOGLOBIN: Primary | ICD-10-CM

## 2022-01-18 LAB
BASOPHILS # BLD AUTO: 0.09 10*3/MM3 (ref 0–0.2)
BASOPHILS NFR BLD AUTO: 0.6 % (ref 0–1.5)
DEPRECATED RDW RBC AUTO: 41.4 FL (ref 37–54)
EOSINOPHIL # BLD AUTO: 0.28 10*3/MM3 (ref 0–0.4)
EOSINOPHIL NFR BLD AUTO: 1.8 % (ref 0.3–6.2)
ERYTHROCYTE [DISTWIDTH] IN BLOOD BY AUTOMATED COUNT: 12.7 % (ref 12.3–15.4)
HCT VFR BLD AUTO: 49.8 % (ref 37.5–51)
HGB BLD-MCNC: 17.5 G/DL (ref 13–17.7)
IMM GRANULOCYTES # BLD AUTO: 0.07 10*3/MM3 (ref 0–0.05)
IMM GRANULOCYTES NFR BLD AUTO: 0.5 % (ref 0–0.5)
LYMPHOCYTES # BLD AUTO: 4.12 10*3/MM3 (ref 0.7–3.1)
LYMPHOCYTES NFR BLD AUTO: 26.6 % (ref 19.6–45.3)
MCH RBC QN AUTO: 31 PG (ref 26.6–33)
MCHC RBC AUTO-ENTMCNC: 35.1 G/DL (ref 31.5–35.7)
MCV RBC AUTO: 88.3 FL (ref 79–97)
MONOCYTES # BLD AUTO: 0.86 10*3/MM3 (ref 0.1–0.9)
MONOCYTES NFR BLD AUTO: 5.6 % (ref 5–12)
NEUTROPHILS NFR BLD AUTO: 10.07 10*3/MM3 (ref 1.7–7)
NEUTROPHILS NFR BLD AUTO: 64.9 % (ref 42.7–76)
NRBC BLD AUTO-RTO: 0 /100 WBC (ref 0–0.2)
PLATELET # BLD AUTO: 323 10*3/MM3 (ref 140–450)
PMV BLD AUTO: 9.4 FL (ref 6–12)
RBC # BLD AUTO: 5.64 10*6/MM3 (ref 4.14–5.8)
WBC NRBC COR # BLD: 15.49 10*3/MM3 (ref 3.4–10.8)

## 2022-01-18 PROCEDURE — 36415 COLL VENOUS BLD VENIPUNCTURE: CPT

## 2022-01-18 PROCEDURE — 85025 COMPLETE CBC W/AUTO DIFF WBC: CPT | Performed by: NURSE PRACTITIONER

## 2022-01-18 PROCEDURE — G0463 HOSPITAL OUTPT CLINIC VISIT: HCPCS

## 2022-01-18 RX ORDER — SODIUM CHLORIDE 9 MG/ML
250 INJECTION, SOLUTION INTRAVENOUS ONCE
Status: CANCELLED | OUTPATIENT
Start: 2022-01-18

## 2022-01-18 RX ORDER — SODIUM CHLORIDE 9 MG/ML
250 INJECTION, SOLUTION INTRAVENOUS ONCE
Status: DISCONTINUED | OUTPATIENT
Start: 2022-01-18 | End: 2022-01-20 | Stop reason: HOSPADM

## 2022-01-26 ENCOUNTER — APPOINTMENT (OUTPATIENT)
Dept: ULTRASOUND IMAGING | Facility: HOSPITAL | Age: 37
End: 2022-01-26

## 2023-05-26 DIAGNOSIS — R20.2 PARESTHESIA: Primary | ICD-10-CM

## 2023-10-24 ENCOUNTER — HOSPITAL ENCOUNTER (EMERGENCY)
Facility: HOSPITAL | Age: 38
Discharge: HOME | End: 2023-10-25
Attending: STUDENT IN AN ORGANIZED HEALTH CARE EDUCATION/TRAINING PROGRAM | Admitting: STUDENT IN AN ORGANIZED HEALTH CARE EDUCATION/TRAINING PROGRAM
Payer: COMMERCIAL

## 2023-10-24 DIAGNOSIS — J18.9 PNEUMONIA DUE TO INFECTIOUS ORGANISM, UNSPECIFIED LATERALITY, UNSPECIFIED PART OF LUNG: ICD-10-CM

## 2023-10-24 DIAGNOSIS — F10.920 ALCOHOLIC INTOXICATION WITHOUT COMPLICATION: Primary | ICD-10-CM

## 2023-10-24 LAB
ALBUMIN SERPL-MCNC: 4.6 G/DL (ref 3.5–5.2)
ALBUMIN/GLOB SERPL: 1.7 G/DL
ALP SERPL-CCNC: 77 U/L (ref 39–117)
ALT SERPL W P-5'-P-CCNC: 12 U/L (ref 1–41)
ANION GAP SERPL CALCULATED.3IONS-SCNC: 15.7 MMOL/L (ref 5–15)
APAP SERPL-MCNC: <5 MCG/ML (ref 0–30)
AST SERPL-CCNC: 21 U/L (ref 1–40)
BILIRUB SERPL-MCNC: 0.3 MG/DL (ref 0–1.2)
BUN SERPL-MCNC: 9 MG/DL (ref 6–20)
BUN/CREAT SERPL: 10.2 (ref 7–25)
CALCIUM SPEC-SCNC: 9.6 MG/DL (ref 8.6–10.5)
CHLORIDE SERPL-SCNC: 105 MMOL/L (ref 98–107)
CO2 SERPL-SCNC: 22.3 MMOL/L (ref 22–29)
CREAT SERPL-MCNC: 0.88 MG/DL (ref 0.76–1.27)
EGFRCR SERPLBLD CKD-EPI 2021: 114.3 ML/MIN/1.73
ETHANOL BLD-MCNC: 294 MG/DL (ref 0–10)
ETHANOL UR QL: 0.29 %
GLOBULIN UR ELPH-MCNC: 2.7 GM/DL
GLUCOSE SERPL-MCNC: 116 MG/DL (ref 65–99)
INR PPP: 0.97 (ref 0.9–1.1)
POTASSIUM SERPL-SCNC: 3.5 MMOL/L (ref 3.5–5.2)
PROT SERPL-MCNC: 7.3 G/DL (ref 6–8.5)
PROTHROMBIN TIME: 13.4 SECONDS (ref 12.1–14.7)
SALICYLATES SERPL-MCNC: <0.3 MG/DL
SODIUM SERPL-SCNC: 143 MMOL/L (ref 136–145)

## 2023-10-24 PROCEDURE — 96361 HYDRATE IV INFUSION ADD-ON: CPT

## 2023-10-24 PROCEDURE — 80053 COMPREHEN METABOLIC PANEL: CPT | Performed by: STUDENT IN AN ORGANIZED HEALTH CARE EDUCATION/TRAINING PROGRAM

## 2023-10-24 PROCEDURE — 80179 DRUG ASSAY SALICYLATE: CPT | Performed by: STUDENT IN AN ORGANIZED HEALTH CARE EDUCATION/TRAINING PROGRAM

## 2023-10-24 PROCEDURE — 82077 ASSAY SPEC XCP UR&BREATH IA: CPT | Performed by: STUDENT IN AN ORGANIZED HEALTH CARE EDUCATION/TRAINING PROGRAM

## 2023-10-24 PROCEDURE — 85025 COMPLETE CBC W/AUTO DIFF WBC: CPT | Performed by: STUDENT IN AN ORGANIZED HEALTH CARE EDUCATION/TRAINING PROGRAM

## 2023-10-24 PROCEDURE — 99285 EMERGENCY DEPT VISIT HI MDM: CPT

## 2023-10-24 PROCEDURE — 25810000003 SODIUM CHLORIDE 0.9 % SOLUTION: Performed by: STUDENT IN AN ORGANIZED HEALTH CARE EDUCATION/TRAINING PROGRAM

## 2023-10-24 PROCEDURE — 83735 ASSAY OF MAGNESIUM: CPT | Performed by: STUDENT IN AN ORGANIZED HEALTH CARE EDUCATION/TRAINING PROGRAM

## 2023-10-24 PROCEDURE — 85007 BL SMEAR W/DIFF WBC COUNT: CPT | Performed by: STUDENT IN AN ORGANIZED HEALTH CARE EDUCATION/TRAINING PROGRAM

## 2023-10-24 PROCEDURE — 85610 PROTHROMBIN TIME: CPT | Performed by: STUDENT IN AN ORGANIZED HEALTH CARE EDUCATION/TRAINING PROGRAM

## 2023-10-24 PROCEDURE — 84443 ASSAY THYROID STIM HORMONE: CPT | Performed by: STUDENT IN AN ORGANIZED HEALTH CARE EDUCATION/TRAINING PROGRAM

## 2023-10-24 PROCEDURE — 80143 DRUG ASSAY ACETAMINOPHEN: CPT | Performed by: STUDENT IN AN ORGANIZED HEALTH CARE EDUCATION/TRAINING PROGRAM

## 2023-10-24 RX ORDER — SODIUM CHLORIDE 0.9 % (FLUSH) 0.9 %
10 SYRINGE (ML) INJECTION AS NEEDED
Status: DISCONTINUED | OUTPATIENT
Start: 2023-10-24 | End: 2023-10-25 | Stop reason: HOSPADM

## 2023-10-24 RX ORDER — ONDANSETRON 2 MG/ML
4 INJECTION INTRAMUSCULAR; INTRAVENOUS EVERY 6 HOURS PRN
Status: DISCONTINUED | OUTPATIENT
Start: 2023-10-24 | End: 2023-10-25 | Stop reason: HOSPADM

## 2023-10-24 RX ADMIN — SODIUM CHLORIDE 2000 ML: 9 INJECTION, SOLUTION INTRAVENOUS at 23:51

## 2023-10-25 ENCOUNTER — APPOINTMENT (OUTPATIENT)
Dept: GENERAL RADIOLOGY | Facility: HOSPITAL | Age: 38
End: 2023-10-25
Payer: COMMERCIAL

## 2023-10-25 VITALS
SYSTOLIC BLOOD PRESSURE: 137 MMHG | RESPIRATION RATE: 18 BRPM | HEART RATE: 93 BPM | WEIGHT: 200 LBS | TEMPERATURE: 97.6 F | HEIGHT: 71 IN | DIASTOLIC BLOOD PRESSURE: 87 MMHG | BODY MASS INDEX: 28 KG/M2 | OXYGEN SATURATION: 98 %

## 2023-10-25 LAB
AMPHET+METHAMPHET UR QL: NEGATIVE
AMPHETAMINES UR QL: NEGATIVE
BARBITURATES UR QL SCN: NEGATIVE
BENZODIAZ UR QL SCN: NEGATIVE
BILIRUB UR QL STRIP: NEGATIVE
BUPRENORPHINE SERPL-MCNC: NEGATIVE NG/ML
CANNABINOIDS SERPL QL: NEGATIVE
CLARITY UR: CLEAR
COCAINE UR QL: NEGATIVE
COLOR UR: YELLOW
D-LACTATE SERPL-SCNC: 2 MMOL/L (ref 0.5–2)
DEPRECATED RDW RBC AUTO: 47.5 FL (ref 37–54)
EOSINOPHIL # BLD MANUAL: 0.17 10*3/MM3 (ref 0–0.4)
EOSINOPHIL NFR BLD MANUAL: 1 % (ref 0.3–6.2)
ERYTHROCYTE [DISTWIDTH] IN BLOOD BY AUTOMATED COUNT: 13.5 % (ref 12.3–15.4)
ETHANOL BLD-MCNC: 292 MG/DL (ref 0–10)
ETHANOL BLD-MCNC: 66 MG/DL (ref 0–10)
ETHANOL UR QL: 0.07 %
ETHANOL UR QL: 0.29 %
FENTANYL UR-MCNC: NEGATIVE NG/ML
GLUCOSE BLDC GLUCOMTR-MCNC: 135 MG/DL (ref 70–130)
GLUCOSE UR STRIP-MCNC: NEGATIVE MG/DL
HCT VFR BLD AUTO: 54 % (ref 37.5–51)
HGB BLD-MCNC: 19.1 G/DL (ref 13–17.7)
HGB UR QL STRIP.AUTO: NEGATIVE
HOLD SPECIMEN: NORMAL
HOLD SPECIMEN: NORMAL
KETONES UR QL STRIP: NEGATIVE
LEUKOCYTE ESTERASE UR QL STRIP.AUTO: NEGATIVE
LYMPHOCYTES # BLD MANUAL: 5.02 10*3/MM3 (ref 0.7–3.1)
LYMPHOCYTES NFR BLD MANUAL: 5 % (ref 5–12)
MAGNESIUM SERPL-MCNC: 2.2 MG/DL (ref 1.6–2.6)
MCH RBC QN AUTO: 33.6 PG (ref 26.6–33)
MCHC RBC AUTO-ENTMCNC: 35.4 G/DL (ref 31.5–35.7)
MCV RBC AUTO: 95.1 FL (ref 79–97)
METHADONE UR QL SCN: NEGATIVE
MONOCYTES # BLD: 0.87 10*3/MM3 (ref 0.1–0.9)
NEUTROPHILS # BLD AUTO: 11.25 10*3/MM3 (ref 1.7–7)
NEUTROPHILS NFR BLD MANUAL: 65 % (ref 42.7–76)
NITRITE UR QL STRIP: NEGATIVE
OPIATES UR QL: NEGATIVE
OXYCODONE UR QL SCN: NEGATIVE
PCP UR QL SCN: NEGATIVE
PH UR STRIP.AUTO: 6 [PH] (ref 5–8)
PLAT MORPH BLD: NORMAL
PLATELET # BLD AUTO: 352 10*3/MM3 (ref 140–450)
PMV BLD AUTO: 9 FL (ref 6–12)
PROT UR QL STRIP: ABNORMAL
QT INTERVAL: 372 MS
QTC INTERVAL: 474 MS
RBC # BLD AUTO: 5.68 10*6/MM3 (ref 4.14–5.8)
RBC MORPH BLD: NORMAL
SCAN SLIDE: NORMAL
SP GR UR STRIP: 1.02 (ref 1–1.03)
TRICYCLICS UR QL SCN: NEGATIVE
TSH SERPL DL<=0.05 MIU/L-ACNC: 1.39 UIU/ML (ref 0.27–4.2)
UROBILINOGEN UR QL STRIP: ABNORMAL
VARIANT LYMPHS NFR BLD MANUAL: 29 % (ref 19.6–45.3)
WBC NRBC COR # BLD: 17.3 10*3/MM3 (ref 3.4–10.8)
WHOLE BLOOD HOLD COAG: NORMAL
WHOLE BLOOD HOLD SPECIMEN: NORMAL

## 2023-10-25 PROCEDURE — 83605 ASSAY OF LACTIC ACID: CPT | Performed by: FAMILY MEDICINE

## 2023-10-25 PROCEDURE — 87040 BLOOD CULTURE FOR BACTERIA: CPT | Performed by: FAMILY MEDICINE

## 2023-10-25 PROCEDURE — 96361 HYDRATE IV INFUSION ADD-ON: CPT

## 2023-10-25 PROCEDURE — 25010000002 CEFTRIAXONE PER 250 MG: Performed by: FAMILY MEDICINE

## 2023-10-25 PROCEDURE — 82948 REAGENT STRIP/BLOOD GLUCOSE: CPT

## 2023-10-25 PROCEDURE — 71045 X-RAY EXAM CHEST 1 VIEW: CPT | Performed by: RADIOLOGY

## 2023-10-25 PROCEDURE — 81003 URINALYSIS AUTO W/O SCOPE: CPT | Performed by: STUDENT IN AN ORGANIZED HEALTH CARE EDUCATION/TRAINING PROGRAM

## 2023-10-25 PROCEDURE — 82077 ASSAY SPEC XCP UR&BREATH IA: CPT | Performed by: STUDENT IN AN ORGANIZED HEALTH CARE EDUCATION/TRAINING PROGRAM

## 2023-10-25 PROCEDURE — 93010 ELECTROCARDIOGRAM REPORT: CPT | Performed by: SPECIALIST

## 2023-10-25 PROCEDURE — 80307 DRUG TEST PRSMV CHEM ANLYZR: CPT | Performed by: STUDENT IN AN ORGANIZED HEALTH CARE EDUCATION/TRAINING PROGRAM

## 2023-10-25 PROCEDURE — 25810000003 SODIUM CHLORIDE 0.9 % SOLUTION: Performed by: STUDENT IN AN ORGANIZED HEALTH CARE EDUCATION/TRAINING PROGRAM

## 2023-10-25 PROCEDURE — 36415 COLL VENOUS BLD VENIPUNCTURE: CPT

## 2023-10-25 PROCEDURE — 96365 THER/PROPH/DIAG IV INF INIT: CPT

## 2023-10-25 PROCEDURE — 71045 X-RAY EXAM CHEST 1 VIEW: CPT

## 2023-10-25 PROCEDURE — 93005 ELECTROCARDIOGRAM TRACING: CPT | Performed by: STUDENT IN AN ORGANIZED HEALTH CARE EDUCATION/TRAINING PROGRAM

## 2023-10-25 RX ORDER — SODIUM CHLORIDE 9 MG/ML
150 INJECTION, SOLUTION INTRAVENOUS CONTINUOUS
Status: DISCONTINUED | OUTPATIENT
Start: 2023-10-25 | End: 2023-10-25 | Stop reason: HOSPADM

## 2023-10-25 RX ORDER — AZITHROMYCIN 250 MG/1
TABLET, FILM COATED ORAL
Qty: 6 TABLET | Refills: 0 | Status: SHIPPED | OUTPATIENT
Start: 2023-10-25

## 2023-10-25 RX ORDER — IBUPROFEN 400 MG/1
800 TABLET ORAL EVERY 6 HOURS PRN
Status: DISCONTINUED | OUTPATIENT
Start: 2023-10-25 | End: 2023-10-25 | Stop reason: HOSPADM

## 2023-10-25 RX ADMIN — SODIUM CHLORIDE 150 ML/HR: 9 INJECTION, SOLUTION INTRAVENOUS at 02:38

## 2023-10-25 RX ADMIN — CEFTRIAXONE 2000 MG: 2 INJECTION, POWDER, FOR SOLUTION INTRAMUSCULAR; INTRAVENOUS at 05:24

## 2023-10-25 NOTE — NURSING NOTE
Pt presents to intake seeking help detoxing from alcohol. Patient states he's been having 6-8 drinks per day for the last two weeks, pt states he's been drinking whiskey. Patient is experiencing sensitivity to light and tingling in his hands. Denies SI/HI/AVH. Pt has never experienced DT's or seizures during any detox. Patient's last drink was last night. Patient's CIWA score this morning is 4. Pt is currently on abx, cxr showed pneumonia.

## 2023-10-25 NOTE — NURSING NOTE
Pt received abx in ED for pneumonia. Per Dr. Cazares he requested staff to speak with ED provider regarding any additional orders for floor. Spoke to ED provider Dr. Zavala. Suggested Z pack for floor.

## 2023-10-25 NOTE — ED NOTES
Patient presents to the ER for alcohol detox. Patient states that he has been drinking a lot within the last two weeks. Patient states that his last drink was around 2 hours ago.

## 2023-10-25 NOTE — NURSING NOTE
Patient was given discharge and follow up instructions and verbalized understanding. Patient discharged from facility with all belongings.

## 2023-10-25 NOTE — NURSING NOTE
Patient presents to intake at 09:25. Patient searched per policy with two staff members present. Items logged and placed in intake locker. Safety checks in place.

## 2023-10-25 NOTE — NURSING NOTE
Spoke with Dr. Cazares and he recommended patient be discharged and referred to a rehab facility. [id number]

## 2023-10-25 NOTE — DISCHARGE INSTRUCTIONS
Please follow-up with the alcohol rehab resources were provided.    Please take the azithromycin as prescribed to treat your pneumonia.

## 2023-10-25 NOTE — ED PROVIDER NOTES
Subjective   History of Present Illness  Patient is a 38-year-old male with history significant for hypertension, polysubstance abuse who comes to the ER acutely intoxicated for alcohol detox.  He is drank three quarters of fifth of fireball today.  Last drink was 2 hours prior to arrival.  He reports persistent tobacco abuse.  Denies any chest pain/pressure or tightness.        Review of Systems   Constitutional:  Negative for chills, fatigue and fever.   HENT:  Negative for congestion, sinus pain and sore throat.    Respiratory:  Negative for cough, chest tightness, shortness of breath and wheezing.    Cardiovascular:  Negative for chest pain, palpitations and leg swelling.   Gastrointestinal:  Negative for abdominal pain, constipation, diarrhea, nausea and vomiting.   Genitourinary:  Negative for dysuria, frequency, hematuria and urgency.   Musculoskeletal:  Negative for arthralgias and myalgias.   Neurological:  Negative for dizziness, numbness and headaches.   Psychiatric/Behavioral:  Negative for confusion.        Past Medical History:   Diagnosis Date    Arrhythmia     Chest pain     Fatigue     Gastroenteritis     Hypertension     Substance abuse        No Known Allergies    Past Surgical History:   Procedure Laterality Date    URETEROSCOPY LASER LITHOTRIPSY WITH STENT INSERTION         Family History   Family history unknown: Yes       Social History     Socioeconomic History    Marital status:    Tobacco Use    Smoking status: Every Day     Packs/day: 1.00     Years: 17.00     Additional pack years: 0.00     Total pack years: 17.00     Types: Cigarettes    Smokeless tobacco: Never    Tobacco comments:     smoking since 17 years old   Vaping Use    Vaping Use: Never used   Substance and Sexual Activity    Alcohol use: Yes     Comment: daily x2 weeks    Drug use: Not Currently     Types: Benzodiazepines, Heroin, Oxycodone    Sexual activity: Not Currently     Partners: Female           Objective    Physical Exam  Vitals and nursing note reviewed. Exam conducted with a chaperone present.   Constitutional:       General: He is not in acute distress.     Appearance: He is well-developed and normal weight. He is not ill-appearing.   HENT:      Head: Normocephalic.      Mouth/Throat:      Mouth: Mucous membranes are moist.      Pharynx: Oropharynx is clear.   Eyes:      Extraocular Movements: Extraocular movements intact.      Pupils: Pupils are equal, round, and reactive to light.   Neck:      Vascular: No JVD.   Cardiovascular:      Rate and Rhythm: Regular rhythm. Tachycardia present.      Heart sounds: No murmur heard.     No friction rub. No gallop.   Pulmonary:      Effort: Pulmonary effort is normal. No tachypnea.      Breath sounds: Normal breath sounds. No decreased breath sounds, wheezing, rhonchi or rales.   Abdominal:      General: Bowel sounds are normal.      Palpations: Abdomen is soft.   Musculoskeletal:         General: Normal range of motion.      Cervical back: Normal range of motion and neck supple.      Right lower leg: No edema.      Left lower leg: No edema.   Skin:     General: Skin is warm and dry.      Capillary Refill: Capillary refill takes less than 2 seconds.   Neurological:      General: No focal deficit present.      Mental Status: He is alert and oriented to person, place, and time.   Psychiatric:         Mood and Affect: Mood normal.         Behavior: Behavior normal.         Procedures           ED Course  ED Course as of 10/25/23 1043   Wed Oct 25, 2023   0112 ECG 12 Lead  Normal sinus rhythm, rate 98, QTc 474, no acute ST or T wave changes [CW]   0909 Repeat alcohol level 66.  Patient taken back to intake for detox evaluation. [KH]   1007 Discussed with intake.  Recommended Z-Jewel for pneumonia.   [KH]   1043 Spoke with intake.  Patient does not qualify for inpatient alcohol detox.  Discharged with outpatient resources and prescription for azithromycin. [KH]      ED Course User  Index  [CW] Ruslan Abbott, DO  [KH] Kylah Zavala MD                                           Medical Decision Making  --Patient is hemodynamically stable, WBC 17 K with no source, likely reactive  --Serum alcohol 294  --IV fluid resuscitation, as needed Zofran  --Psych to see    Care assumed from mid shift doctor. Stable overnight, no acute events.      Problems Addressed:  Alcoholic intoxication without complication: complicated acute illness or injury  Pneumonia due to infectious organism, unspecified laterality, unspecified part of lung: complicated acute illness or injury    Amount and/or Complexity of Data Reviewed  Labs: ordered.  Radiology: ordered.  ECG/medicine tests: ordered. Decision-making details documented in ED Course.    Risk  Prescription drug management.        Final diagnoses:   Alcoholic intoxication without complication   Pneumonia due to infectious organism, unspecified laterality, unspecified part of lung       ED Disposition  ED Disposition       ED Disposition   Discharge    Condition   Stable    Comment   --               Chela Oconnor, APRN  [address] [facility] [address] [phone]               Medication List        New Prescriptions      azithromycin 250 MG tablet  Commonly known as: Zithromax Z-Jewel  Take 2 tablets by mouth on day 1, then 1 tablet daily on days 2-5               Where to Get Your Medications        These medications were sent to [id number] - [place], [place] - [address] AT [facility] OF [facility] - [phone]  - [phone] [address] [address]      Phone: [phone]   azithromycin 250 MG tablet            Kylah Zavala MD  10/25/23 1007       Kylah Zavala MD  10/25/23 4300

## 2023-10-30 LAB
BACTERIA SPEC AEROBE CULT: NORMAL
BACTERIA SPEC AEROBE CULT: NORMAL

## 2024-06-07 NOTE — PROGRESS NOTES
Patient: Cecil Ugalde    YOB: 1985    Date: 06/10/2024    Primary Care Provider: Chela Oconnor APRN    Chief Complaint   Patient presents with    Hemorrhoids       SUBJECTIVE:    History of present illness:  I saw the patient in the office today as a consultation for evaluation and treatment of rectal pain which he attributes to hemorrhoids.    He does give a history significant for blood per rectum, this has been going on for many months and seems to be getting worse recently.  He does have external hemorrhoidal tissue that bulges out with bowel movements.    The following portions of the patient's history were reviewed and updated as appropriate: allergies, current medications, past family history, past medical history, past social history, past surgical history and problem list.      Review of Systems:  Constitutional:  Negative for chills, fever, and unexpected weight change.  HENT: Negative for trouble swallowing and voice change.  Eyes:  Negative for visual disturbance.  Respiratory:  Negative for apnea, cough, chest tightness, shortness of breath, and wheezing.  Cardiovascular:  Negative for chest pain, palpitations, and leg swelling.  Gastrointestinal:  Negative for abdominal distention, abdominal pain, there is a history of anal bleeding and blood in stool, constipation, diarrhea, nausea, rectal pain, and vomiting.  Musculoskeletal:  Negative for back pain, gait problem, and joint swelling.  Skin:  Negative for color change, rash, and wound  Neurological:  Negative for dizziness, syncope, speech difficulty, weakness, numbness, and headaches.  Hematological:  Negative for adenopathy.  Does not bruise/bleed easily.  Psychiatric/Behavioral:  Negative for confusion.  The patient is not nervous/anxious.          History:  Past Medical History:   Diagnosis Date    Arrhythmia     Chest pain     Fatigue     Gastroenteritis     Hypertension     Substance abuse        Past Surgical History:  "  Procedure Laterality Date    URETEROSCOPY LASER LITHOTRIPSY WITH STENT INSERTION         Family History   Family history unknown: Yes       Social History     Tobacco Use    Smoking status: Every Day     Current packs/day: 1.00     Average packs/day: 1 pack/day for 17.0 years (17.0 ttl pk-yrs)     Types: Cigarettes    Smokeless tobacco: Never    Tobacco comments:     smoking since 17 years old   Vaping Use    Vaping status: Never Used   Substance Use Topics    Alcohol use: Yes     Comment: daily x2 weeks    Drug use: Not Currently     Types: Benzodiazepines, Heroin, Oxycodone       Allergies:  No Known Allergies    Medications:    Current Outpatient Medications:     amLODIPine (NORVASC) 5 MG tablet, Take 1 tablet by mouth Daily., Disp: , Rfl:     DULoxetine (CYMBALTA) 30 MG capsule, TAKE 1 CAPSULE(30 MG) BY MOUTH 1 TIME EACH DAY. DO NOT CRUSH OR CHEW, Disp: , Rfl:     Glycopyrronium Tosylate 2.4 % pads, Apply 1 each topically to the appropriate area as directed Daily., Disp: , Rfl:     hydrocortisone 1 % ointment, Apply  topically to the appropriate area as directed., Disp: , Rfl:     mirtazapine (REMERON SOL-TAB) 15 MG disintegrating tablet, Place 1 tablet under the tongue Daily., Disp: , Rfl:     azithromycin (Zithromax Z-Jewel) 250 MG tablet, Take 2 tablets by mouth on day 1, then 1 tablet daily on days 2-5 (Patient not taking: Reported on 6/10/2024), Disp: 6 tablet, Rfl: 0    OBJECTIVE:    Vital Signs:   Vitals:    06/10/24 0859   BP: 130/90   Pulse: 90   Temp: 98.4 °F (36.9 °C)   SpO2: 97%   Weight: 91.6 kg (202 lb)   Height: 180.3 cm (70.98\")         Physical Exam:     General Appearance:    Alert, cooperative, in no acute distress   Head:    Normocephalic, without obvious abnormality, atraumatic   Eyes:            Lids and lashes normal, conjunctivae and sclerae normal, no   icterus, no pallor, corneas clear, PERRLA   Ears:    Ears appear intact with no abnormalities noted   Throat:   No oral lesions, no " thrush, oral mucosa moist   Neck:   No adenopathy, supple, trachea midline, no thyromegaly, no   carotid bruit, no JVD   Back:     No kyphosis present, no scoliosis present, no skin lesions,      erythema or scars, no tenderness to percussion or                   palpation,   range of motion normal   Lungs:     Clear to auscultation,respirations regular, even and                  unlabored    Heart:    Regular rhythm and normal rate, normal S1 and S2, no            murmur, no gallop, no rub, no click   Chest Wall:    No abnormalities observed   Abdomen:     Normal bowel sounds, no masses, no organomegaly, soft        non-tender, non-distended, no guarding, there is evidence of right upper quadrant tenderness   Extremities:   Moves all extremities well, no edema, no cyanosis, no             redness   Pulses:   Pulses palpable and equal bilaterally   Skin:   No bleeding, bruising or rash   Lymph nodes:   No palpable adenopathy   Neurologic:   Cranial nerves 2 - 12 grossly intact, sensation intact, DTR       present and equal bilaterally        Results Review:   I reviewed the patient's new clinical results.  I reviewed the patient's new imaging results and agree with the interpretation.  I reviewed the patient's other test results and agree with the interpretation    Review of Systems was reviewed and confirmed as accurate as documented by the MA.    ASSESSMENT/PLAN:    1. Rectal bleed        I did have a detailed and extensive discussion with the patient in the office today.  The full risks and benefits of operative versus nonoperative intervention were discussed with the paient, they understand, agree, and wish to proceed with the surgical treatment plan of colonoscopy.    The patient fully understands that he may need to undergo hemorrhoidal banding at the time of intervention.    Electronically signed by Sidney Garcia MD  06/10/24 14:21 EDT

## 2024-06-10 ENCOUNTER — OFFICE VISIT (OUTPATIENT)
Dept: SURGERY | Facility: CLINIC | Age: 39
End: 2024-06-10
Payer: COMMERCIAL

## 2024-06-10 VITALS
DIASTOLIC BLOOD PRESSURE: 90 MMHG | HEART RATE: 90 BPM | HEIGHT: 71 IN | OXYGEN SATURATION: 97 % | TEMPERATURE: 98.4 F | SYSTOLIC BLOOD PRESSURE: 130 MMHG | WEIGHT: 202 LBS | BODY MASS INDEX: 28.28 KG/M2

## 2024-06-10 DIAGNOSIS — K62.5 RECTAL BLEED: Primary | ICD-10-CM

## 2024-06-10 PROCEDURE — 99243 OFF/OP CNSLTJ NEW/EST LOW 30: CPT | Performed by: SURGERY

## 2024-06-10 PROCEDURE — 1160F RVW MEDS BY RX/DR IN RCRD: CPT | Performed by: SURGERY

## 2024-06-10 PROCEDURE — 1159F MED LIST DOCD IN RCRD: CPT | Performed by: SURGERY

## 2024-06-10 RX ORDER — DULOXETIN HYDROCHLORIDE 30 MG/1
CAPSULE, DELAYED RELEASE ORAL
COMMUNITY
Start: 2024-04-03

## 2024-06-10 RX ORDER — BISACODYL 5 MG/1
TABLET, DELAYED RELEASE ORAL
Qty: 4 TABLET | Refills: 0 | Status: SHIPPED | OUTPATIENT
Start: 2024-06-10

## 2024-06-10 RX ORDER — POLYETHYLENE GLYCOL 3350 17 G/17G
238 POWDER, FOR SOLUTION ORAL ONCE
Qty: 238 G | Refills: 0 | Status: SHIPPED | OUTPATIENT
Start: 2024-06-10 | End: 2024-06-10

## 2024-06-10 RX ORDER — AMLODIPINE BESYLATE 5 MG/1
1 TABLET ORAL DAILY
COMMUNITY
Start: 2024-04-23

## 2024-06-10 RX ORDER — DIAPER,BRIEF,INFANT-TODD,DISP
EACH MISCELLANEOUS
COMMUNITY
Start: 2024-05-31

## 2024-06-10 RX ORDER — MIRTAZAPINE 15 MG/1
15 TABLET, ORALLY DISINTEGRATING ORAL DAILY
COMMUNITY
Start: 2024-05-31 | End: 2024-06-30

## 2024-07-19 ENCOUNTER — OUTSIDE FACILITY SERVICE (OUTPATIENT)
Dept: SURGERY | Facility: CLINIC | Age: 39
End: 2024-07-19
Payer: COMMERCIAL